# Patient Record
Sex: MALE | Race: BLACK OR AFRICAN AMERICAN | Employment: FULL TIME | URBAN - METROPOLITAN AREA
[De-identification: names, ages, dates, MRNs, and addresses within clinical notes are randomized per-mention and may not be internally consistent; named-entity substitution may affect disease eponyms.]

---

## 2018-07-02 ENCOUNTER — HOSPITAL ENCOUNTER (EMERGENCY)
Age: 28
Discharge: HOME | End: 2018-07-02
Attending: EMERGENCY MEDICINE

## 2018-07-02 VITALS
WEIGHT: 150 LBS | OXYGEN SATURATION: 98 % | HEIGHT: 64 IN | RESPIRATION RATE: 18 BRPM | BODY MASS INDEX: 25.61 KG/M2 | HEART RATE: 83 BPM | SYSTOLIC BLOOD PRESSURE: 121 MMHG | TEMPERATURE: 97.3 F | DIASTOLIC BLOOD PRESSURE: 77 MMHG

## 2018-07-02 DIAGNOSIS — L02.91 ABSCESS: Primary | ICD-10-CM

## 2018-07-02 PROCEDURE — 86403 PARTICLE AGGLUT ANTBDY SCRN: CPT

## 2018-07-02 PROCEDURE — 87070 CULTURE OTHR SPECIMN AEROBIC: CPT

## 2018-07-02 PROCEDURE — 99283 EMERGENCY DEPT VISIT LOW MDM: CPT

## 2018-07-02 PROCEDURE — 10060 I&D ABSCESS SIMPLE/SINGLE: CPT

## 2018-07-02 PROCEDURE — 87205 SMEAR GRAM STAIN: CPT

## 2018-07-02 RX ORDER — IBUPROFEN 800 MG/1
800 TABLET ORAL EVERY 8 HOURS PRN
Qty: 20 TABLET | Refills: 0 | Status: SHIPPED | OUTPATIENT
Start: 2018-07-02 | End: 2019-04-30 | Stop reason: ALTCHOICE

## 2018-07-02 RX ORDER — HYDROCODONE BITARTRATE AND ACETAMINOPHEN 5; 325 MG/1; MG/1
1 TABLET ORAL EVERY 8 HOURS PRN
Qty: 6 TABLET | Refills: 0 | Status: SHIPPED | OUTPATIENT
Start: 2018-07-02 | End: 2018-07-04

## 2018-07-02 RX ORDER — DOXYCYCLINE HYCLATE 100 MG
100 TABLET ORAL 2 TIMES DAILY
Qty: 14 TABLET | Refills: 0 | Status: SHIPPED | OUTPATIENT
Start: 2018-07-02 | End: 2018-07-09

## 2018-07-02 ASSESSMENT — ENCOUNTER SYMPTOMS
VOMITING: 0
EYE PAIN: 0
NAUSEA: 0
WHEEZING: 0
COUGH: 0
SHORTNESS OF BREATH: 0
BACK PAIN: 0
RHINORRHEA: 0
SORE THROAT: 0
EYE ITCHING: 0
EYE DISCHARGE: 0

## 2018-07-02 ASSESSMENT — PAIN DESCRIPTION - LOCATION: LOCATION: ARM

## 2018-07-02 ASSESSMENT — PAIN DESCRIPTION - PAIN TYPE: TYPE: ACUTE PAIN

## 2018-07-02 ASSESSMENT — PAIN DESCRIPTION - ORIENTATION: ORIENTATION: RIGHT

## 2018-07-02 ASSESSMENT — PAIN SCALES - GENERAL: PAINLEVEL_OUTOF10: 8

## 2018-07-02 NOTE — ED NOTES
Pt to ed with abscess to the right axillary x 1 week. Pt states he tried to drain it yesterday without any luck. Pt states it is draining clear fluid.  Pt rates pain [facility]  07/02/18 1720

## 2018-07-02 NOTE — ED PROVIDER NOTES
[facility]     Emergency Department     Faculty Attestation    I performed a history and physical examination of the patient and discussed management with the resident. I reviewed the residents note and agree with the documented findings including all diagnostic interpretations and plan of care. Any areas of disagreement are noted on the chart. I was personally present for the key portions of any procedures. I have documented in the chart those procedures where I was not present during the key portions. I have reviewed the emergency nurses triage note. I agree with the chief complaint, past medical history, past surgical history, allergies, medications, social and family history as documented unless otherwise noted below. Documentation of the HPI, Physical Exam and Medical Decision Making performed by scribcar is based on my personal performance of the HPI, PE and MDM. For Physician Assistant/ Nurse Practitioner cases/documentation I have personally evaluated this patient and have completed at least one if not all key elements of the E/M (history, physical exam, and MDM). Additional findings are as noted. Primary Care Physician: Ras Yanez MD    History: This is a 29 y.o. male who presents to the Emergency Department with complaint of abscess to right axilla. Ongoing for 1 week. No drainage. No fevers. Has previously had abscesses before that he drained himself. Physical:     height is 5' 4\" (1.626 m) and weight is 150 lb (68 kg). His oral temperature is 97.3 °F (36.3 °C). His blood pressure is 121/77 and his pulse is 83. His respiration is 18 and oxygen saturation is 98%.    29 y.o. male no acute distress, right axilla shows significant area of fluctuance and tenderness no surrounding cellulitis.     Impression: Abscess    Plan: Incision and drainage, Carissa Larios MD  Attending Emergency Physician        Lizandro Saucedo,

## 2018-07-04 LAB
CULTURE: ABNORMAL
DIRECT EXAM: ABNORMAL
Lab: ABNORMAL
SPECIMEN DESCRIPTION: ABNORMAL
STATUS: ABNORMAL

## 2019-04-06 ENCOUNTER — HOSPITAL ENCOUNTER (EMERGENCY)
Age: 29
Discharge: HOME | End: 2019-04-06
Attending: EMERGENCY MEDICINE
Payer: COMMERCIAL

## 2019-04-06 VITALS
RESPIRATION RATE: 16 BRPM | BODY MASS INDEX: 26.61 KG/M2 | HEART RATE: 102 BPM | TEMPERATURE: 97.7 F | DIASTOLIC BLOOD PRESSURE: 80 MMHG | OXYGEN SATURATION: 99 % | SYSTOLIC BLOOD PRESSURE: 130 MMHG | WEIGHT: 155 LBS

## 2019-04-06 DIAGNOSIS — B07.9 VIRAL WARTS, UNSPECIFIED TYPE: Primary | ICD-10-CM

## 2019-04-06 PROCEDURE — G0382 LEV 3 HOSP TYPE B ED VISIT: HCPCS

## 2019-04-06 ASSESSMENT — ENCOUNTER SYMPTOMS
BACK PAIN: 0
SORE THROAT: 0
EYE DISCHARGE: 0
VOMITING: 0
WHEEZING: 0
RHINORRHEA: 0
NAUSEA: 0
SHORTNESS OF BREATH: 0
EYE ITCHING: 0
COUGH: 0
EYE PAIN: 0
ROS SKIN COMMENTS: +LESION

## 2019-04-06 NOTE — ED NOTES
Walk-in clinic stated they're unable to assist patient, will have to go to dermatologist. Roberth Nevarez provided patient list of dermatologists for insurance.       Hunter Layne, MSW, LSW  04/06/19 9358

## 2019-04-06 NOTE — ED PROVIDER NOTES
No    Sexual activity: Yes     Partners: Female   Lifestyle    Physical activity:     Days per week: Not on file     Minutes per session: Not on file    Stress: Not on file   Relationships    Social connections:     Talks on phone: Not on file     Gets together: Not on file     Attends Baptism service: Not on file     Active member of club or organization: Not on file     Attends meetings of clubs or organizations: Not on file     Relationship status: Not on file    Intimate partner violence:     Fear of current or ex partner: Not on file     Emotionally abused: Not on file     Physically abused: Not on file     Forced sexual activity: Not on file   Other Topics Concern    Not on file   Social History Narrative    Not on file       History reviewed. No pertinent family history. Allergies:  Patient has no known allergies. Home Medications:  Prior to Admission medications    Medication Sig Start Date End Date Taking? Authorizing Provider   ibuprofen (ADVIL;MOTRIN) 800 MG tablet Take 1 tablet by mouth every 8 hours as needed for Pain 7/2/18   Agusto Peralta PA-C       patient's medication list has been reviewed as entered by the nursing staff. REVIEW OF SYSTEMS    (2-9 systems for level 4, 10 or more for level 5)      Review of Systems   Constitutional: Negative for chills and fever. HENT: Negative for ear pain, rhinorrhea and sore throat. Eyes: Negative for pain, discharge and itching. Respiratory: Negative for cough, shortness of breath and wheezing. Cardiovascular: Negative for chest pain and palpitations. Gastrointestinal: Negative for nausea and vomiting. Musculoskeletal: Negative for back pain and myalgias. Skin: Negative for rash and wound. +lesion       PHYSICAL EXAM  (up to 7 for level 4, 8 or more for level 5)      INITIAL VITALS:  weight is 155 lb (70.3 kg). His oral temperature is 97.7 °F (36.5 °C). His blood pressure is 130/80 and his pulse is 102.  His respiration

## 2019-04-08 ENCOUNTER — TELEPHONE (OUTPATIENT)
Dept: DERMATOLOGY | Age: 29
End: 2019-04-08

## 2019-04-30 ENCOUNTER — OFFICE VISIT (OUTPATIENT)
Dept: DERMATOLOGY | Age: 29
End: 2019-04-30
Payer: COMMERCIAL

## 2019-04-30 VITALS
WEIGHT: 150.2 LBS | SYSTOLIC BLOOD PRESSURE: 131 MMHG | HEART RATE: 96 BPM | DIASTOLIC BLOOD PRESSURE: 84 MMHG | BODY MASS INDEX: 25.78 KG/M2 | OXYGEN SATURATION: 98 %

## 2019-04-30 DIAGNOSIS — D48.5 NEOPLASM OF UNCERTAIN BEHAVIOR OF SKIN: Primary | ICD-10-CM

## 2019-04-30 PROCEDURE — 99999 PR OFFICE/OUTPT VISIT,PROCEDURE ONLY: CPT | Performed by: DERMATOLOGY

## 2019-04-30 PROCEDURE — 11102 TANGNTL BX SKIN SINGLE LES: CPT | Performed by: DERMATOLOGY

## 2019-04-30 PROCEDURE — 11103 TANGNTL BX SKIN EA SEP/ADDL: CPT | Performed by: DERMATOLOGY

## 2019-04-30 RX ORDER — LIDOCAINE HYDROCHLORIDE AND EPINEPHRINE 10; 10 MG/ML; UG/ML
0.5 INJECTION, SOLUTION INFILTRATION; PERINEURAL ONCE
Status: COMPLETED | OUTPATIENT
Start: 2019-04-30 | End: 2019-04-30

## 2019-04-30 RX ADMIN — LIDOCAINE HYDROCHLORIDE AND EPINEPHRINE 0.5 ML: 10; 10 INJECTION, SOLUTION INFILTRATION; PERINEURAL at 11:43

## 2019-04-30 NOTE — PROGRESS NOTES
Dermatology Patient Note  [facility] DERMATOLOGY  [address]  [address] [place] [address]  Dept: [phone]  Dept Fax: [phone]      VISITDATE: 4/30/2019   REFERRING PROVIDER: No ref. provider found      Gonzales Marcial is a 34 y.o. male  who presents today in the office for:    New Patient (Pt states that he has gential warts that he would like removed. )      HISTORY OF PRESENT ILLNESS:  34 y.o. male presenting for warts  Location: genitals  Duration: 5 years  Symptoms: none  Course: stable  Exacerbating factors: unsure  Prior treatments: none  Decided he wanted to have them treated      CURRENT MEDICATIONS:   No current outpatient medications on file. Current Facility-Administered Medications   Medication Dose Route Frequency Provider Last Rate Last Dose    lidocaine-EPINEPHrine 1 percent-1:336140 injection 0.5 mL  0.5 mL Intradermal Once Patricia Rg MD           ALLERGIES:   No Known Allergies    SOCIAL HISTORY:  Social History     Tobacco Use    Smoking status: Current Every Day Smoker     Packs/day: 0.50     Types: Cigarettes    Smokeless tobacco: Never Used   Substance Use Topics    Alcohol use: Yes       REVIEW OF SYSTEMS:  Review of Systems  Skin: Denies any new changing, growing orbleeding lesions or rashes except as described in the HPI   Constitutional: Denies fevers, chills, and malaise. PHYSICAL EXAM:   /84 (Site: Right Upper Arm, Position: Sitting, Cuff Size: Medium Adult)   Pulse 96   Wt 150 lb 3.2 oz (68.1 kg)   SpO2 98%   BMI 25.78 kg/m²     General Exam:  General Appearance: No acute distress, Well nourished     Neuro: Alert and oriented to person, place and time  Psych: Normal affect   Lymph Node: Not performed    Cutaneous Exam: Performed as documented in clinic note below. Sun-exposed skin, which includes the head/face, neck, both arms, digits and/or nails was examined.  Also examined genitals    Pertinent Physical Exam Findings:  Physical Fever    Biopsy Results    Biopsy results are usually available in 1-2 weeks. We provide biopsy results in letters for begin results or we will call for any concerning results. If you have not heard from our staff please call the office within 2 weeks. Please call our office with any concerns at [phone]. Follow up in 6 weeks. Follow-up: No follow-ups on file. This note was created with the assistance of a speech-recognition program.  Although the intention is to generate a document that actually reflects the content of the visit, no guarantees can be provided that every mistake has been identified and corrected byediting.     Electronically signed by Fransisca Vanegas MD on 4/30/19 at 9:53 AM

## 2019-05-02 ENCOUNTER — TELEPHONE (OUTPATIENT)
Dept: DERMATOLOGY | Age: 29
End: 2019-05-02

## 2019-05-02 NOTE — TELEPHONE ENCOUNTER
Please inform patient that the lesions were genital warts as expected.  Keep appt in June for cryotherapy

## 2019-05-02 NOTE — TELEPHONE ENCOUNTER
Pt advised.     Future Appointments   Date Time Provider Roldan Belia   6/12/2019  3:45 PM Daniel Burden MD  derm [facility]

## 2019-11-02 ENCOUNTER — HOSPITAL ENCOUNTER (EMERGENCY)
Age: 29
Discharge: HOME | End: 2019-11-02
Attending: EMERGENCY MEDICINE
Payer: COMMERCIAL

## 2019-11-02 VITALS
WEIGHT: 155 LBS | SYSTOLIC BLOOD PRESSURE: 118 MMHG | OXYGEN SATURATION: 97 % | TEMPERATURE: 98.5 F | DIASTOLIC BLOOD PRESSURE: 82 MMHG | HEART RATE: 98 BPM | BODY MASS INDEX: 26.46 KG/M2 | RESPIRATION RATE: 14 BRPM | HEIGHT: 64 IN

## 2019-11-02 DIAGNOSIS — H10.31 ACUTE CONJUNCTIVITIS OF RIGHT EYE, UNSPECIFIED ACUTE CONJUNCTIVITIS TYPE: Primary | ICD-10-CM

## 2019-11-02 LAB
ANION GAP SERPL CALCULATED.3IONS-SCNC: 13 MMOL/L (ref 9–17)
BUN BLDV-MCNC: 9 MG/DL (ref 6–20)
BUN/CREAT BLD: ABNORMAL (ref 9–20)
CALCIUM SERPL-MCNC: 9.3 MG/DL (ref 8.6–10.4)
CHLORIDE BLD-SCNC: 97 MMOL/L (ref 98–107)
CO2: 25 MMOL/L (ref 20–31)
CREAT SERPL-MCNC: 1.15 MG/DL (ref 0.7–1.2)
DIRECT EXAM: NORMAL
GFR AFRICAN AMERICAN: >60 ML/MIN
GFR NON-AFRICAN AMERICAN: >60 ML/MIN
GFR SERPL CREATININE-BSD FRML MDRD: ABNORMAL ML/MIN/{1.73_M2}
GFR SERPL CREATININE-BSD FRML MDRD: ABNORMAL ML/MIN/{1.73_M2}
GLUCOSE BLD-MCNC: 109 MG/DL (ref 70–99)
HCT VFR BLD CALC: 46.8 % (ref 40.7–50.3)
HEMOGLOBIN: 15.3 G/DL (ref 13–17)
Lab: NORMAL
MCH RBC QN AUTO: 28.1 PG (ref 25.2–33.5)
MCHC RBC AUTO-ENTMCNC: 32.7 G/DL (ref 28.4–34.8)
MCV RBC AUTO: 85.9 FL (ref 82.6–102.9)
NRBC AUTOMATED: 0 PER 100 WBC
PDW BLD-RTO: 12.2 % (ref 11.8–14.4)
PLATELET # BLD: 311 K/UL (ref 138–453)
PMV BLD AUTO: 8.7 FL (ref 8.1–13.5)
POTASSIUM SERPL-SCNC: 4.3 MMOL/L (ref 3.7–5.3)
RBC # BLD: 5.45 M/UL (ref 4.21–5.77)
SODIUM BLD-SCNC: 135 MMOL/L (ref 135–144)
SPECIMEN DESCRIPTION: NORMAL
WBC # BLD: 13.7 K/UL (ref 3.5–11.3)

## 2019-11-02 PROCEDURE — 6370000000 HC RX 637 (ALT 250 FOR IP): Performed by: STUDENT IN AN ORGANIZED HEALTH CARE EDUCATION/TRAINING PROGRAM

## 2019-11-02 PROCEDURE — 87880 STREP A ASSAY W/OPTIC: CPT

## 2019-11-02 PROCEDURE — 80048 BASIC METABOLIC PNL TOTAL CA: CPT

## 2019-11-02 PROCEDURE — 2580000003 HC RX 258: Performed by: STUDENT IN AN ORGANIZED HEALTH CARE EDUCATION/TRAINING PROGRAM

## 2019-11-02 PROCEDURE — 85027 COMPLETE CBC AUTOMATED: CPT

## 2019-11-02 PROCEDURE — 99283 EMERGENCY DEPT VISIT LOW MDM: CPT

## 2019-11-02 PROCEDURE — 2500000003 HC RX 250 WO HCPCS: Performed by: STUDENT IN AN ORGANIZED HEALTH CARE EDUCATION/TRAINING PROGRAM

## 2019-11-02 RX ORDER — POLYMYXIN B SULFATE AND TRIMETHOPRIM 1; 10000 MG/ML; [USP'U]/ML
1 SOLUTION OPHTHALMIC EVERY 4 HOURS
Qty: 1 BOTTLE | Refills: 0 | Status: SHIPPED | OUTPATIENT
Start: 2019-11-02 | End: 2019-11-09

## 2019-11-02 RX ORDER — IBUPROFEN 400 MG/1
400 TABLET ORAL EVERY 8 HOURS PRN
Qty: 30 TABLET | Refills: 0 | Status: SHIPPED | OUTPATIENT
Start: 2019-11-02

## 2019-11-02 RX ORDER — 0.9 % SODIUM CHLORIDE 0.9 %
1000 INTRAVENOUS SOLUTION INTRAVENOUS ONCE
Status: COMPLETED | OUTPATIENT
Start: 2019-11-02 | End: 2019-11-02

## 2019-11-02 RX ORDER — IBUPROFEN 400 MG/1
400 TABLET ORAL ONCE
Status: COMPLETED | OUTPATIENT
Start: 2019-11-02 | End: 2019-11-02

## 2019-11-02 RX ORDER — PROPARACAINE HYDROCHLORIDE 5 MG/ML
1 SOLUTION/ DROPS OPHTHALMIC ONCE
Status: COMPLETED | OUTPATIENT
Start: 2019-11-02 | End: 2019-11-02

## 2019-11-02 RX ADMIN — PROPARACAINE HYDROCHLORIDE 1 DROP: 5 SOLUTION/ DROPS OPHTHALMIC at 12:06

## 2019-11-02 RX ADMIN — IBUPROFEN 400 MG: 400 TABLET, FILM COATED ORAL at 12:06

## 2019-11-02 RX ADMIN — SODIUM CHLORIDE 1000 ML: 9 INJECTION, SOLUTION INTRAVENOUS at 12:17

## 2019-11-02 RX ADMIN — FLUORESCEIN SODIUM 1 MG: 1 STRIP OPHTHALMIC at 12:07

## 2019-11-02 ASSESSMENT — ENCOUNTER SYMPTOMS
SORE THROAT: 0
SHORTNESS OF BREATH: 0
EYE DISCHARGE: 1
EYE REDNESS: 1
PHOTOPHOBIA: 0
DIARRHEA: 0
VOMITING: 0
CONSTIPATION: 0
EYE PAIN: 0
COUGH: 0
RHINORRHEA: 0
NAUSEA: 0

## 2019-11-02 ASSESSMENT — PAIN DESCRIPTION - ORIENTATION: ORIENTATION: RIGHT

## 2019-11-02 ASSESSMENT — PAIN SCALES - GENERAL
PAINLEVEL_OUTOF10: 6
PAINLEVEL_OUTOF10: 6

## 2019-11-02 ASSESSMENT — PAIN DESCRIPTION - DESCRIPTORS: DESCRIPTORS: DISCOMFORT

## 2019-11-02 ASSESSMENT — PAIN DESCRIPTION - FREQUENCY: FREQUENCY: CONTINUOUS

## 2019-11-02 ASSESSMENT — PAIN DESCRIPTION - LOCATION: LOCATION: EYE

## 2019-11-02 ASSESSMENT — PAIN DESCRIPTION - PAIN TYPE: TYPE: ACUTE PAIN

## 2019-11-06 ENCOUNTER — HOSPITAL ENCOUNTER (EMERGENCY)
Age: 29
Discharge: HOME | End: 2019-11-06
Attending: EMERGENCY MEDICINE
Payer: COMMERCIAL

## 2019-11-06 VITALS
SYSTOLIC BLOOD PRESSURE: 133 MMHG | HEART RATE: 99 BPM | RESPIRATION RATE: 16 BRPM | HEIGHT: 64 IN | TEMPERATURE: 97.9 F | DIASTOLIC BLOOD PRESSURE: 84 MMHG | WEIGHT: 144 LBS | OXYGEN SATURATION: 100 % | BODY MASS INDEX: 24.59 KG/M2

## 2019-11-06 DIAGNOSIS — J06.9 VIRAL URI: Primary | ICD-10-CM

## 2019-11-06 PROCEDURE — 99282 EMERGENCY DEPT VISIT SF MDM: CPT

## 2019-11-06 RX ORDER — FLUTICASONE PROPIONATE 50 MCG
1 SPRAY, SUSPENSION (ML) NASAL DAILY
Qty: 1 BOTTLE | Refills: 0 | Status: SHIPPED | OUTPATIENT
Start: 2019-11-06 | End: 2019-11-13

## 2019-11-06 ASSESSMENT — ENCOUNTER SYMPTOMS
SORE THROAT: 1
RHINORRHEA: 1
COUGH: 0
SINUS PRESSURE: 1

## 2019-11-06 ASSESSMENT — PAIN SCALES - GENERAL: PAINLEVEL_OUTOF10: 2

## 2021-04-08 ENCOUNTER — HOSPITAL ENCOUNTER (EMERGENCY)
Age: 31
Discharge: LEFT BEFORE DISCHARGE | End: 2021-04-08
Attending: EMERGENCY MEDICINE

## 2021-04-08 VITALS
TEMPERATURE: 100.2 F | DIASTOLIC BLOOD PRESSURE: 84 MMHG | RESPIRATION RATE: 16 BRPM | BODY MASS INDEX: 24.75 KG/M2 | WEIGHT: 145 LBS | HEIGHT: 64 IN | OXYGEN SATURATION: 98 % | SYSTOLIC BLOOD PRESSURE: 135 MMHG | HEART RATE: 124 BPM

## 2021-04-08 DIAGNOSIS — B34.9 VIRAL ILLNESS: Primary | ICD-10-CM

## 2021-04-08 NOTE — PROGRESS NOTES
Patient eloped prior to my evaluation. I did not see or participate in the care of this patient.     Patricia Archer MD

## 2022-06-03 ENCOUNTER — HOSPITAL ENCOUNTER (EMERGENCY)
Age: 32
Discharge: HOME | End: 2022-06-03
Attending: EMERGENCY MEDICINE
Payer: COMMERCIAL

## 2022-06-03 VITALS
BODY MASS INDEX: 21.85 KG/M2 | OXYGEN SATURATION: 99 % | HEART RATE: 58 BPM | SYSTOLIC BLOOD PRESSURE: 131 MMHG | DIASTOLIC BLOOD PRESSURE: 80 MMHG | TEMPERATURE: 96.8 F | WEIGHT: 128 LBS | HEIGHT: 64 IN | RESPIRATION RATE: 18 BRPM

## 2022-06-03 DIAGNOSIS — R30.0 DYSURIA: Primary | ICD-10-CM

## 2022-06-03 LAB
-: ABNORMAL
BILIRUBIN URINE: NEGATIVE
COLOR: YELLOW
EPITHELIAL CELLS UA: ABNORMAL /HPF (ref 0–5)
GLUCOSE URINE: NEGATIVE
KETONES, URINE: ABNORMAL
LEUKOCYTE ESTERASE, URINE: ABNORMAL
NITRITE, URINE: NEGATIVE
PH UA: 6.5 (ref 5–8)
PROTEIN UA: ABNORMAL
RBC UA: ABNORMAL /HPF (ref 0–4)
SOURCE: NORMAL
SPECIFIC GRAVITY UA: 1.03 (ref 1–1.03)
TRICHOMONAS VAGINALI, MOLECULAR: NEGATIVE
TURBIDITY: ABNORMAL
URINE HGB: ABNORMAL
UROBILINOGEN, URINE: NORMAL
WBC UA: ABNORMAL /HPF (ref 0–5)

## 2022-06-03 PROCEDURE — 87661 TRICHOMONAS VAGINALIS AMPLIF: CPT

## 2022-06-03 PROCEDURE — 87491 CHLMYD TRACH DNA AMP PROBE: CPT

## 2022-06-03 PROCEDURE — 87591 N.GONORRHOEAE DNA AMP PROB: CPT

## 2022-06-03 PROCEDURE — 96372 THER/PROPH/DIAG INJ SC/IM: CPT

## 2022-06-03 PROCEDURE — 99284 EMERGENCY DEPT VISIT MOD MDM: CPT

## 2022-06-03 PROCEDURE — 6370000000 HC RX 637 (ALT 250 FOR IP): Performed by: STUDENT IN AN ORGANIZED HEALTH CARE EDUCATION/TRAINING PROGRAM

## 2022-06-03 PROCEDURE — 81001 URINALYSIS AUTO W/SCOPE: CPT

## 2022-06-03 PROCEDURE — 6360000002 HC RX W HCPCS: Performed by: STUDENT IN AN ORGANIZED HEALTH CARE EDUCATION/TRAINING PROGRAM

## 2022-06-03 RX ORDER — DOXYCYCLINE HYCLATE 100 MG
100 TABLET ORAL ONCE
Status: COMPLETED | OUTPATIENT
Start: 2022-06-03 | End: 2022-06-03

## 2022-06-03 RX ORDER — CEFTRIAXONE 500 MG/1
500 INJECTION, POWDER, FOR SOLUTION INTRAMUSCULAR; INTRAVENOUS ONCE
Status: COMPLETED | OUTPATIENT
Start: 2022-06-03 | End: 2022-06-03

## 2022-06-03 RX ORDER — DOXYCYCLINE HYCLATE 100 MG
100 TABLET ORAL 2 TIMES DAILY
Qty: 14 TABLET | Refills: 0 | Status: SHIPPED | OUTPATIENT
Start: 2022-06-03 | End: 2022-06-10

## 2022-06-03 RX ADMIN — CEFTRIAXONE SODIUM 500 MG: 500 INJECTION, POWDER, FOR SOLUTION INTRAMUSCULAR; INTRAVENOUS at 06:56

## 2022-06-03 RX ADMIN — DOXYCYCLINE HYCLATE 100 MG: 100 TABLET, COATED ORAL at 06:56

## 2022-06-03 ASSESSMENT — ENCOUNTER SYMPTOMS
SHORTNESS OF BREATH: 0
NAUSEA: 0
ABDOMINAL PAIN: 0
VOMITING: 0

## 2022-06-03 NOTE — ED PROVIDER NOTES
[facility] ED  Emergency Department  Faculty Sign-Out Addendum     Care of Mason Phoenix was assumed from previous attending and is being seen for No chief complaint on file. .  The patient's initial evaluation and plan have been discussed with the prior provider who initially evaluated the patient. Handoff taken on the following patient from prior Attending Physician:    Mercedes Boyd    I was available and discussed any additional care issues that arose and coordinated the management plans with the resident(s) caring for the patient during my duty period. Any areas of disagreement with residents documentation of care or procedures are noted on the chart. I was personally present for the key portions of any/all procedures during my duty period. I have documented in the chart those procedures where I was not present during the key portions. EMERGENCY DEPARTMENT COURSE / MEDICAL DECISION MAKING:       MEDICATIONS GIVEN:  Orders Placed This Encounter   Medications    cefTRIAXone (ROCEPHIN) injection 500 mg     Order Specific Question:   Antimicrobial Indications     Answer:   STD infection    doxycycline hyclate (VIBRA-TABS) tablet 100 mg     Order Specific Question:   Antimicrobial Indications     Answer:   STD infection       LABS / RADIOLOGY:     Labs Reviewed   C.TRACHOMATIS N.GONORRHOEAE DNA, URINE   TRICHOMONAS VAGINALI, MOLECULAR   URINALYSIS WITH MICROSCOPIC       No results found. RECENT VITALS:     Temp: 96.8 °F (36 °C),  Heart Rate: 58, Resp: 18, BP: 131/80, SpO2: 99 %    This patient is a 28 y.o. Male with dysuria. Concern for STI. Awaiting urine results. Empiric treatment. OUTSTANDING TASKS / RECOMMENDATIONS:    1.  Urine results      Dianna Saldana MD, Janes Sellers  Attending Emergency Physician  [facility] ED        Adele Arevalo MD  06/03/22 5404

## 2022-06-03 NOTE — ED PROVIDER NOTES
[facility]  ED  Emergency Department Encounter  EmergencyMedicine Resident     Pt Name:Fer Rebolledo  MRN: [id number]  [place] 1990  Date of evaluation: 6/3/22  PCP:  No primary care provider on file. CHIEF COMPLAINT       No chief complaint on file. HISTORY OF PRESENT ILLNESS  (Location/Symptom, Timing/Onset, Context/Setting, Quality, Duration, Modifying Factors, Severity.)      Hamzah Xiong is a 28 y.o. male who presents with dysuria, penile discharge, concern for STD. Patient states symptoms have been ongoing for past 2 days. Denies any associated fever, chills, nausea, vomiting, abdominal pain, rash. No associated testicular swelling, testicular pain. PAST MEDICAL / SURGICAL / SOCIAL / FAMILY HISTORY      has a past medical history of History of gonorrhea. Denies any past surgical history    Social History     Socioeconomic History    Marital status:      Spouse name: Not on file    Number of children: Not on file    Years of education: Not on file    Highest education level: Not on file   Occupational History    Not on file   Tobacco Use    Smoking status: Current Every Day Smoker     Packs/day: 0.25     Types: Cigarettes    Smokeless tobacco: Never Used   Substance and Sexual Activity    Alcohol use: Yes    Drug use: No    Sexual activity: Not Currently     Partners: Female   Other Topics Concern    Not on file   Social History Narrative    Not on file     Social Determinants of Health     Financial Resource Strain:     Difficulty of Paying Living Expenses: Not on file   Food Insecurity:     Worried About Running Out of Food in the Last Year: Not on file    [place] of Food in the Last Year: Not on file   Transportation Needs:     Lack of Transportation (Medical): Not on file    Lack of Transportation (Non-Medical):  Not on file   Physical Activity:     Days of Exercise per Week: Not on file    Minutes of Exercise per Session: Not on file Stress:     Feeling of Stress : Not on file   Social Connections:     Frequency of Communication with Friends and Family: Not on file    Frequency of Social Gatherings with Friends and Family: Not on file    Attends Taoist Services: Not on file    Active Member of Clubs or Organizations: Not on file    Attends Club or Organization Meetings: Not on file    Marital Status: Not on file   Intimate Partner Violence:     Fear of Current or Ex-Partner: Not on file    Emotionally Abused: Not on file    Physically Abused: Not on file    Sexually Abused: Not on file   Housing Stability:     Unable to Pay for Housing in the Last Year: Not on file    Number of [place] in the Last Year: Not on file    Unstable Housing in the Last Year: Not on file       No family history on file. Allergies:  Patient has no known allergies. Home Medications:  Prior to Admission medications    Medication Sig Start Date End Date Taking? Authorizing Provider   doxycycline hyclate (VIBRA-TABS) 100 MG tablet Take 1 tablet by mouth 2 times daily for 7 days 6/3/22 6/10/22 Yes Maribel Roles, DO   fluticasone [facility]) 50 MCG/ACT nasal spray 1 spray by Each Nostril route daily for 7 days 11/6/19 11/13/19  Dorlene Flatness, APRN - CNP   ibuprofen (IBU) 400 MG tablet Take 1 tablet by mouth every 8 hours as needed for Pain 11/2/19   [facility], DO       REVIEW OF SYSTEMS    (2-9 systems for level 4, 10 or more for level 5)      Review of Systems   Constitutional: Negative for fatigue and fever. Respiratory: Negative for shortness of breath. Cardiovascular: Negative for chest pain. Gastrointestinal: Negative for abdominal pain, nausea and vomiting. Genitourinary: Positive for dysuria and penile discharge. Negative for flank pain, penile pain and penile swelling. Skin: Negative for rash. Neurological: Negative for syncope and headaches.        PHYSICAL EXAM   (up to 7 for level 4, 8 or more for level 5)      INITIAL VITALS:   /80   Pulse 58   Temp 96.8 °F (36 °C)   Resp 18   Ht 5' 4\" (1.626 m)   Wt 128 lb (58.1 kg)   SpO2 99%   BMI 21.97 kg/m²     Physical Exam  Constitutional:       General: He is not in acute distress. Appearance: He is not toxic-appearing. HENT:      Head: Normocephalic and atraumatic. Cardiovascular:      Rate and Rhythm: Normal rate. Pulmonary:      Effort: Pulmonary effort is normal.   Abdominal:      General: There is no distension. Tenderness: There is no abdominal tenderness. Neurological:      Mental Status: He is alert.       Gait: Gait normal.         DIFFERENTIAL  DIAGNOSIS     PLAN (LABS / IMAGING / EKG):  Orders Placed This Encounter   Procedures    C.trachomatis N.gonorrhoeae DNA, Urine    Trichomonas Vaginali, Molecular    Urinalysis with Microscopic       MEDICATIONS ORDERED:  Orders Placed This Encounter   Medications    cefTRIAXone (ROCEPHIN) injection 500 mg     Order Specific Question:   Antimicrobial Indications     Answer:   STD infection    doxycycline hyclate (VIBRA-TABS) tablet 100 mg     Order Specific Question:   Antimicrobial Indications     Answer:   STD infection    doxycycline hyclate (VIBRA-TABS) 100 MG tablet     Sig: Take 1 tablet by mouth 2 times daily for 7 days     Dispense:  14 tablet     Refill:  0       DDX: Gonorrhea, chlamydia, trichomonas, UTI    DIAGNOSTIC RESULTS / EMERGENCY DEPARTMENT COURSE / MDM   LAB RESULTS:  Results for orders placed or performed during the hospital encounter of 06/03/22   Urinalysis with Microscopic   Result Value Ref Range    Color, UA Yellow Yellow    Turbidity UA Cloudy (A) Clear    Glucose, Ur NEGATIVE NEGATIVE    Bilirubin Urine NEGATIVE NEGATIVE    Ketones, Urine TRACE (A) NEGATIVE    Specific Gravity, UA 1.029 1.005 - 1.030    Urine Hgb SMALL (A) NEGATIVE    pH, UA 6.5 5.0 - 8.0    Protein, UA 2+ (A) NEGATIVE    Urobilinogen, Urine Normal Normal    Nitrite, Urine NEGATIVE NEGATIVE    Leukocyte Esterase, Urine LARGE (A) NEGATIVE    -          WBC, UA TOO NUMEROUS TO COUNT 0 - 5 /HPF    RBC, UA 10 TO 20 0 - 4 /HPF    Epithelial Cells UA None 0 - 5 /HPF       IMPRESSION/ ED Course: 79-year-old male in no acute distress sitting with dysuria, penile discharge ongoing for past 2 days. Vital signs within normal limits, exam benign. Urinalysis remarkable for numerous WBCs, large leukocyte esterase. Patient treated presumptively for gonorrhea and chlamydia with IM Rocephin and p.o. doxycycline, given outpatient prescription for p.o. doxycycline and instructions to follow-up on his results as well as ED return precautions. He verbalized understanding of and agreement with discharge plan. CONSULTS:  None    CRITICAL CARE:  Please see attending note    FINAL IMPRESSION      1.  Dysuria          DISPOSITION / PLAN     DISPOSITION Decision To Discharge 06/03/2022 08:34:17 AM      PATIENT REFERRED TO:  [address] [place], [place] [id number] [phone]  Schedule an appointment as soon as possible for a visit   For re-evaluation      DISCHARGE MEDICATIONS:  New Prescriptions    DOXYCYCLINE HYCLATE (VIBRA-TABS) 100 MG TABLET    Take 1 tablet by mouth 2 times daily for 7 days       Edwina Baron DO  Emergency Medicine Resident    (Please note that portions of thisnote were completed with a voice recognition program.  Efforts were made to edit the dictations but occasionally words are mis-transcribed.)        Edwina Baron DO  Resident  06/03/22 9239

## 2022-06-04 NOTE — ED PROVIDER NOTES
[facility]   Emergency Department  Faculty Attestation       I performed a history and physical examination of the patient and discussed management with the resident. I reviewed the residents note and agree with the documented findings including all diagnostic interpretations and plan of care. Any areas of disagreement are noted on the chart. I was personally present for the key portions of any procedures. I have documented in the chart those procedures where I was not present during the key portions. I have reviewed the emergency nurses triage note. I agree with the chief complaint, past medical history, past surgical history, allergies, medications, social and family history as documented unless otherwise noted below. Documentation of the HPI, Physical Exam and Medical Decision Making performed by americoibcar is based on my personal performance of the HPI, PE and MDM. For Physician Assistant/ Nurse Practitioner cases/documentation I have personally evaluated this patient and have completed at least one if not all key elements of the E/M (history, physical exam, and MDM). Additional findings are as noted. Pertinent Comments     Primary Care Physician: No primary care provider on file. ED Triage Vitals   BP Temp Temp src Heart Rate Resp SpO2 Height Weight   06/03/22 0647 06/03/22 0647 -- 06/03/22 0647 06/03/22 0647 06/03/22 0647 06/03/22 0648 06/03/22 0648   131/80 96.8 °F (36 °C)  58 18 99 % 5' 4\" (1.626 m) 128 lb (58.1 kg)        History/Physical:   This is a 28 y.o. male who presents to the Emergency Department for concern of STI. Patient reports dysuria stating approximately 2 ago also having dysuria. denies any known STI exposure. He denies any systemic symptoms. On exam patient is awake and alert in no acute distress. See resident documentation for  exam.     MDM/Plan:   STI check  Send urine for Gonorrhea, Chlamydia, and Trichomoniasis   will empirically treat at this time.    Lenin Carrasco for d/c with instructions to follow-up results on MyChart. Standard post STI testing instructions and safe sex practices given.       Critical Care: None     Edith Martel MD  Attending Emergency Physician ]      Edith Martel MD  06/03/22 8541

## 2022-06-07 ENCOUNTER — TELEPHONE (OUTPATIENT)
Dept: PHARMACY | Age: 32
End: 2022-06-07

## 2022-06-07 LAB
C. TRACHOMATIS DNA ,URINE: NEGATIVE
N. GONORRHOEAE DNA, URINE: ABNORMAL
SPECIMEN DESCRIPTION: ABNORMAL

## 2022-06-07 NOTE — TELEPHONE ENCOUNTER
CLINICAL PHARMACY NOTE:  Documentation of review for Positive STD Test    At the time of Paula Jeffries visit to [facility] on 6/3/2022 STD testing was performed. DNA testing was positive for Gonorrhea. Pregnancy status:  male. While in the ED, patient was given ceftriaxone 500 mg  IM x 1 dose and a prescription for doxycycline 100mg orally twice daily x 7 days. Treatment appropriate, no follow up needed at this time.      Henry Marquez, Pharmacy Student    For Pharmacy Admin Tracking Only     Intervention Detail:    Total # of Interventions Recommended: 0   Total # of Interventions Accepted: 0   Time Spent (min): 5

## 2022-11-28 ENCOUNTER — HOSPITAL ENCOUNTER (EMERGENCY)
Age: 32
Discharge: HOME | End: 2022-11-28
Attending: EMERGENCY MEDICINE
Payer: COMMERCIAL

## 2022-11-28 VITALS
TEMPERATURE: 97.7 F | SYSTOLIC BLOOD PRESSURE: 107 MMHG | DIASTOLIC BLOOD PRESSURE: 80 MMHG | RESPIRATION RATE: 18 BRPM | HEART RATE: 68 BPM | OXYGEN SATURATION: 96 %

## 2022-11-28 DIAGNOSIS — G89.29 CHRONIC LEFT SHOULDER PAIN: Primary | ICD-10-CM

## 2022-11-28 DIAGNOSIS — M25.512 CHRONIC LEFT SHOULDER PAIN: Primary | ICD-10-CM

## 2022-11-28 PROCEDURE — 99283 EMERGENCY DEPT VISIT LOW MDM: CPT

## 2022-11-28 RX ORDER — LIDOCAINE 50 MG/G
1 PATCH TOPICAL DAILY
Qty: 7 PATCH | Refills: 0 | Status: SHIPPED | OUTPATIENT
Start: 2022-11-28 | End: 2022-12-05

## 2022-11-28 RX ORDER — IBUPROFEN 600 MG/1
600 TABLET ORAL 3 TIMES DAILY PRN
Qty: 21 TABLET | Refills: 0 | Status: SHIPPED | OUTPATIENT
Start: 2022-11-28 | End: 2022-12-05

## 2022-11-28 ASSESSMENT — PAIN - FUNCTIONAL ASSESSMENT: PAIN_FUNCTIONAL_ASSESSMENT: 0-10

## 2022-11-28 ASSESSMENT — PAIN SCALES - GENERAL: PAINLEVEL_OUTOF10: 5

## 2022-11-28 NOTE — ED PROVIDER NOTES
[facility] ED  Emergency Department Encounter  Emergency Medicine Resident     Pt Name:Fer Tse  MRN: [id number]  [place] 1990  Date of evaluation: 11/28/22  PCP:  No primary care provider on file. CHIEF COMPLAINT       Chief Complaint   Patient presents with    Shoulder Pain     Left for some months; denies any known injury       HISTORY OF PRESENT ILLNESS  (Location/Symptom, Timing/Onset, Context/Setting, Quality, Duration, Modifying Factors, Severity.)      Bright Pineda is a 28 y.o. male who presents with complaints of left shoulder pain that has been ongoing for months and denies any known injury to shoulder. Patient request work note. No numbness, weakness, tingling or neck pain. Patient notes he is right-hand dominant. Patient notes full range of motion but pain worse with abduction past 90 degrees. PAST MEDICAL / SURGICAL / SOCIAL / FAMILY HISTORY      has a past medical history of History of gonorrhea. Reviewed with patient     has no past surgical history on file.   Reviewed with patient    Social History     Socioeconomic History    Marital status:      Spouse name: Not on file    Number of children: Not on file    Years of education: Not on file    Highest education level: Not on file   Occupational History    Not on file   Tobacco Use    Smoking status: Every Day     Packs/day: 0.25     Types: Cigarettes    Smokeless tobacco: Never   Substance and Sexual Activity    Alcohol use: Yes    Drug use: No    Sexual activity: Not Currently     Partners: Female   Other Topics Concern    Not on file   Social History Narrative    Not on file     Social Determinants of Health     Financial Resource Strain: Not on file   Food Insecurity: Not on file   Transportation Needs: Not on file   Physical Activity: Not on file   Stress: Not on file   Social Connections: Not on file   Intimate Partner Violence: Not on file   Housing Stability: Not on file       No family history on file. Allergies:  Patient has no known allergies. Home Medications:  Prior to Admission medications    Medication Sig Start Date End Date Taking? Authorizing Provider   ibuprofen (ADVIL;MOTRIN) 600 MG tablet Take 1 tablet by mouth 3 times daily as needed for Pain 11/28/22 12/5/22 Yes Ismael Steinberg MD   lidocaine (LIDODERM) 5 % Place 1 patch onto the skin daily for 7 days 12 hours on, 12 hours off. 11/28/22 12/5/22 Yes Ismael Steinberg MD   fluticasone Loras Ernst) 50 MCG/ACT nasal spray 1 spray by Each Nostril route daily for 7 days 11/6/19 11/13/19  Cecilia Navarro, APRN - CNP       REVIEW OF SYSTEMS    (2-9 systems for level 4, 10 or more for level 5)      Review of Systems   Constitutional:  Negative for chills and fever. HENT:  Negative for congestion and rhinorrhea. Eyes:  Negative for photophobia and visual disturbance. Respiratory:  Negative for shortness of breath and wheezing. Cardiovascular:  Negative for chest pain and palpitations. Gastrointestinal:  Negative for abdominal pain, nausea and vomiting. Genitourinary:  Negative for dysuria and frequency. Musculoskeletal:  Negative for back pain and neck pain. Positive for shoulder pain  Skin:  Negative for rash and wound. Neurological:  Negative for dizziness and headaches. PHYSICAL EXAM   (up to 7 for level 4, 8 or more for level 5)      INITIAL VITALS:   /80   Pulse 68   Temp 97.7 °F (36.5 °C) (Oral)   Resp 18   SpO2 96%     Physical Exam  Vitals and nursing note reviewed. Constitutional:       General: He is not in acute distress. HENT:      Head: Atraumatic. Right Ear: External ear normal.      Left Ear: External ear normal.      Nose: Nose normal.      Mouth/Throat:      Mouth: Mucous membranes are moist.      Pharynx: Oropharynx is clear. Eyes:      Conjunctiva/sclera: Conjunctivae normal.   Cardiovascular:      Rate and Rhythm: Normal rate and regular rhythm. Pulses: Normal pulses.    Pulmonary: Effort: Pulmonary effort is normal. No respiratory distress. Breath sounds: Normal breath sounds. No wheezing. Abdominal:      Palpations: Abdomen is soft. Tenderness: There is no abdominal tenderness. Musculoskeletal:         General: Normal range of motion. Cervical back: Normal range of motion. Minimal tenderness to anterior left shoulder to palpation. Skin:     General: Skin is warm and dry. Capillary Refill: Capillary refill takes less than 2 seconds. Neurological:      General: No focal deficit present. Mental Status: He is alert and oriented to person, place, and time. DIFFERENTIAL  DIAGNOSIS     PLAN (LABS / IMAGING / EKG):  No orders of the defined types were placed in this encounter. MEDICATIONS ORDERED:  Orders Placed This Encounter   Medications    ibuprofen (ADVIL;MOTRIN) 600 MG tablet     Sig: Take 1 tablet by mouth 3 times daily as needed for Pain     Dispense:  21 tablet     Refill:  0    lidocaine (LIDODERM) 5 %     Sig: Place 1 patch onto the skin daily for 7 days 12 hours on, 12 hours off. Dispense:  7 patch     Refill:  0       DDX: Musculoskeletal strain, impingement    DIAGNOSTIC RESULTS / EMERGENCY DEPARTMENT COURSE / MDM   LAB RESULTS:  No results found for this visit on 11/28/22. IMPRESSION: Chronic left shoulder pain    RADIOLOGY:  No orders to display       EMERGENCY DEPARTMENT COURSE:  40-year-old male presented to ED with complaints of left shoulder pain has been ongoing for the past few months without any known trauma or falls. Patient is right-hand dominant. On exam, afebrile, nontachycardic, full range of motion of left shoulder, minimal tenderness to anterior aspect of left shoulder. Patient provided with clinic list for PCP follow-up as well as Ortho follow-up instructed to return for any numbness, weakness, tingling, injury to left shoulder. Given prescription for ibuprofen, Lidoderm patch, work note.          No notes of EC Admission Criteria type on file. PROCEDURES:  None    CONSULTS:  None    FINAL IMPRESSION      1.  Chronic left shoulder pain          DISPOSITION / PLAN     DISPOSITION Decision To Discharge 11/28/2022 11:30:27 AM      PATIENT REFERRED TO:  Ludy Jerez, [address]  MOB 1 [facility] 1 [address]  [phone]      as soon as possible    [address] [address]  [phone]  Call in 1 week      [facility] ED  [address] [facility] [address] [phone]    As needed    DISCHARGE MEDICATIONS:  Discharge Medication List as of 11/28/2022 11:35 AM        START taking these medications    Details   lidocaine (LIDODERM) 5 % Place 1 patch onto the skin daily for 7 days 12 hours on, 12 hours off., Disp-7 patch, R-0Print             Boston Multani MD  Emergency Medicine Resident    (Please note that portions of thisnote were completed with a voice recognition program.  Efforts were made to edit the dictations but occasionally words are mis-transcribed.)         Lazaro Roberson MD  Resident  11/28/22 2881

## 2022-11-28 NOTE — Clinical Note
Jeromy Hung was seen and treated in our emergency department on 11/28/2022. He may return to work on 11/30/2022. If you have any questions or concerns, please don't hesitate to call.       Brian Wolf MD

## 2022-11-28 NOTE — DISCHARGE INSTRUCTIONS
Thank you for visiting [address] [facility] Emergency Department. You need to call [phone] [facility] to make an appointment as directed for follow up. Should you have any questions regarding your care or further treatment, please call [facility] Emergency Department at [phone]. Please return to emergency department for any new or worsening symptoms including any numbness, weakness, tingling, injury to left shoulder. [facility] ED Clinic List    Healthcare Providers Services Day of [facility]  [address] [place] [address]  [phone] Pediatric Primary Care  Adult Primary Care  OB/GYN/Prenatal/Specialty Clinics Monday - Friday  8:00a - 4:30p   [address] [place] [address]  Adult Medicine, Pediatrics, OB/GYN Monday - Friday  8:30a - [address] [facility]  [phone]  Wednesday 8:30a - 11:00a   [address] Adult Internal Medicine   [facility]  [phone]  [facility]  [phone]    Pediatric Clinic  [phone]   Monday, Tuesday, Thursday, Friday  8:00a - 4:30p  Wednesday 1:00p - 4:30p  Monday, Tuesday, Thursday 8:00a - 5:00p;  Friday 8:00a - 12:30p  Wednesday 1p - 4p  Monday, Tuesday, Thursday, Friday  8:30a - 4:15p  Wednesday 12:30p - [address] [facility]  [phone] Pediatric Primary Care  Adult Primary Care  OB/Prenatal Monday - Wednesday, Friday Monday - Friday 8a - 12p  Thursday 8a - 4:45p   Heartbeat  [facility]  [phone]  [address] [place] [address] [address]   [phone] Pregnancy  Pre & Post Adoption Counseling  Pregnancy Support  Prenatal / nutrition Care  Reward Incentive Program [place], [place], Fri 10:00a - 4:30p  Thur 10:00a - [address] [place] Location  Monday - Friday [facility]   OB/GYN  [address] [facility],   [address]  [phone]  Adult Internal Medicine  [address] [facility]  [phone]  [address], [address] [facility]  [phone]  Neuro / Headache  [facility] [facility],   [facility]   [phone] Monday - Friday  [address] [facility] [address]  [address]  [phone] [place], [place]  9:00a - 4:30p  Wed 1:00p - 4:30p   [facility] [address]  [phone] Family Practice Monday - Friday  8:30a - 5:00p     [facility]  [address], [facility] [address]  [phone] Burn/Plastic, ENT, GI, Orthopedics, Surgical / Trauma, Urology, Vascular Monday - Friday 8:00 - 4:30p    Call for an appointment   [place] [address]  [phone] Adult Medicine, [facility], Dental  Patient must be certified homeless  Under age 25 not accepted Monday - Friday 8:00 - 101 Dates   [address] [facility]  [phone]  OB   [phone] Monday, Tuesday, Wednesday, Friday 9a - 5p  Thursday 9a - 6p  Wednesday (OB only)     [facility]   [address]  [phone] OB/Prenatal Monday 11a -7p  [place] 9a - 5p  Friday 8a - 4p  1st Saturday 9a -1p   Podiatry Clinic  [address], [facility] [address]  [phone]  Monday - Friday 8:00 - 4:30 p   Pregnancy Center [facility]  [phone] Free nurse visits  [place] programs  Counseling  Pregnancy Class Call or walk in   The [address] [facility]  [phone] [address] [address], [address]  [phone] Family Practice Monday - Friday  8a - 4:30p   [address], [address]  [phone] [address] [place], [address] [phone]    Monday - Friday 8a - 4:30p    Monday - Friday 8a - 4:30p  Thursday 8a - 8p     Outpatient Clinics     Asthma Management [address]  [phone]  Monday - Friday  9a - 5p   Diabetic Education Services  [phone]  Call for an appointment   [address]  [address]  [phone]  Monday - Friday  8:30a - 4p   [facility] of [address] [facility]  [phone] Must have source of income and must bring (2) recent check stubs to appointment By appointment only   [facility] for the [facility]  [address] [facility]  [phone] Patient must be homeless, call for   eligibility guidelines.   Under age 25 NOT accepted Days and hours vary (Doors open at 8:30a - day of week varies)  Call for an appointment   [facility] Call for Help  [phone])  Call for an appointment   [facility]   ([address] [facility])  [phone]   toll free [phone] For financial assistance

## 2022-11-28 NOTE — ED PROVIDER NOTES
[facility]     Emergency Department     Faculty Attestation    I performed a history and physical examination of the patient and discussed management with the resident. I have reviewed and agree with the residents findings including all diagnostic interpretations, and treatment plans as written at the time of my review. Any areas of disagreement are noted on the chart. I was personally present for the key portions of any procedures. I have documented in the chart those procedures where I was not present during the key portions. For Physician Assistant/ Nurse Practitioner cases/documentation I have personally evaluated this patient and have completed at least one if not all key elements of the E/M (history, physical exam, and MDM). Additional findings are as noted. Primary Care Physician: No primary care provider on file. History: This is a 28 y.o. male who presents to the Emergency Department with complaint of shoulder pain. Patient presents emergency department complaint of left shoulder pain this been ongoing for last 2 months. Patient is not taken any analgesia at home. Patient has had pain is worse when he lifts his arm above his head. He denies any numbness, tingling or weakness. He denies any neck pain. Denies any chest pain or shortness of breath. The patient denies any trauma. Physical:   oral temperature is 97.7 °F (36.5 °C). His blood pressure is 107/80 and his pulse is 68. His respiration is 18 and oxygen saturation is 96%. There is some tenderness to palpation along the anterior portion of the left shoulder. Full range of motion is demonstrated. Exacerbation of symptoms with bringing the R arm above the 90 degree abduction. Radial pulses 2/4. There is no exacerbation of pain with axial loading.     Impression: Chronic left shoulder pain    Plan: Analgesia, PCP follow-up      (Please note that portions of this note were completed with a voice recognition program.  Efforts were made to edit the dictations but occasionally words are mis-transcribed.)    Leonora Jacome.  Swathi Barney MD, [facility]  Attending Emergency Medicine Physician        Minor Grace MD  11/28/22 6506

## 2022-11-28 NOTE — ED NOTES
Pt presents to the ED c/o left shoulder pain and decreased mobility x 3 months that is worsening. Pt denies any injury, CP, SOB, nausea or vomiting. Pt denies any neck pain. Pt A&O x 4, does not appear in acute distress, RR even and unlabored, resting comfortably on stretcher with eyes open and call light in reach. Vital signs obtained, medical hx and allergies reviewed with pt. Dr. Keshav Lynn at bedside to assess pt.   Initial assessment performed by physician, [address] [place] [address] will carry out initial orders/tasks and reassess pt.     Yogesh Crandall LPN  17/02/87 8239

## 2023-02-28 VITALS
WEIGHT: 147 LBS | RESPIRATION RATE: 15 BRPM | OXYGEN SATURATION: 100 % | HEIGHT: 64 IN | BODY MASS INDEX: 25.1 KG/M2 | HEART RATE: 90 BPM | DIASTOLIC BLOOD PRESSURE: 73 MMHG | TEMPERATURE: 97.7 F | SYSTOLIC BLOOD PRESSURE: 139 MMHG

## 2023-02-28 PROCEDURE — 99284 EMERGENCY DEPT VISIT MOD MDM: CPT

## 2023-02-28 PROCEDURE — 87491 CHLMYD TRACH DNA AMP PROBE: CPT

## 2023-02-28 PROCEDURE — 87086 URINE CULTURE/COLONY COUNT: CPT

## 2023-02-28 PROCEDURE — 96372 THER/PROPH/DIAG INJ SC/IM: CPT

## 2023-02-28 PROCEDURE — 87591 N.GONORRHOEAE DNA AMP PROB: CPT

## 2023-02-28 PROCEDURE — 81001 URINALYSIS AUTO W/SCOPE: CPT

## 2023-02-28 ASSESSMENT — PAIN - FUNCTIONAL ASSESSMENT: PAIN_FUNCTIONAL_ASSESSMENT: 0-10

## 2023-02-28 ASSESSMENT — PAIN DESCRIPTION - FREQUENCY: FREQUENCY: OTHER (COMMENT)

## 2023-02-28 ASSESSMENT — PAIN DESCRIPTION - LOCATION: LOCATION: PENIS

## 2023-02-28 ASSESSMENT — PAIN SCALES - GENERAL: PAINLEVEL_OUTOF10: 6

## 2023-02-28 ASSESSMENT — PAIN DESCRIPTION - PAIN TYPE: TYPE: ACUTE PAIN

## 2023-02-28 ASSESSMENT — PAIN DESCRIPTION - DESCRIPTORS: DESCRIPTORS: BURNING

## 2023-03-01 ENCOUNTER — HOSPITAL ENCOUNTER (EMERGENCY)
Age: 33
Discharge: HOME | End: 2023-03-01
Attending: EMERGENCY MEDICINE

## 2023-03-01 DIAGNOSIS — N34.2 URETHRITIS: Primary | ICD-10-CM

## 2023-03-01 LAB
BACTERIA: NORMAL
BILIRUBIN URINE: NEGATIVE
CASTS UA: NORMAL /LPF
CHLAMYDIA DNA UR QL NAA+PROBE: NEGATIVE
COLOR: YELLOW
EPITHELIAL CELLS UA: NORMAL /HPF
GLUCOSE UR STRIP.AUTO-MCNC: NEGATIVE MG/DL
KETONES UR STRIP.AUTO-MCNC: ABNORMAL MG/DL
LEUKOCYTE ESTERASE UR QL STRIP.AUTO: ABNORMAL
N GONORRHOEA DNA UR QL NAA+PROBE: NEGATIVE
NITRITE UR QL STRIP.AUTO: NEGATIVE
PROT UR STRIP.AUTO-MCNC: 5.5 MG/DL (ref 5–8)
PROT UR STRIP.AUTO-MCNC: NEGATIVE MG/DL
RBC CLUMPS #/AREA URNS AUTO: NORMAL /HPF
SPECIFIC GRAVITY UA: 1.02 (ref 1–1.03)
SPECIMEN DESCRIPTION: NORMAL
TURBIDITY: CLEAR
URINE HGB: NEGATIVE
UROBILINOGEN, URINE: NORMAL
WBC UA: NORMAL /HPF

## 2023-03-01 PROCEDURE — 6360000002 HC RX W HCPCS: Performed by: EMERGENCY MEDICINE

## 2023-03-01 RX ORDER — CEFTRIAXONE 500 MG/1
500 INJECTION, POWDER, FOR SOLUTION INTRAMUSCULAR; INTRAVENOUS ONCE
Status: COMPLETED | OUTPATIENT
Start: 2023-03-01 | End: 2023-03-01

## 2023-03-01 RX ORDER — DOXYCYCLINE HYCLATE 100 MG
100 TABLET ORAL 2 TIMES DAILY
Qty: 14 TABLET | Refills: 0 | Status: SHIPPED | OUTPATIENT
Start: 2023-03-01 | End: 2023-03-08

## 2023-03-01 RX ADMIN — CEFTRIAXONE SODIUM 500 MG: 500 INJECTION, POWDER, FOR SOLUTION INTRAMUSCULAR; INTRAVENOUS at 01:21

## 2023-03-01 ASSESSMENT — ENCOUNTER SYMPTOMS
RHINORRHEA: 0
COUGH: 0

## 2023-03-01 ASSESSMENT — PAIN - FUNCTIONAL ASSESSMENT: PAIN_FUNCTIONAL_ASSESSMENT: NONE - DENIES PAIN

## 2023-03-01 NOTE — ED PROVIDER NOTES
[address]  EMERGENCY DEPARTMENT ENCOUNTER      Pt Name: Chas Porter  MRN: [id number]  [place] 1990  Date of evaluation: 3/1/23      CHIEF COMPLAINT       Chief Complaint   Patient presents with    Other     Wants checked for STD        HISTORY OF PRESENT ILLNESS   HPI 35 y.o. male presents with c/o std check. Pt reports that he has been having burning with urination and penile drainage for the last day. He reports unprotected intercourse 2 weeks ago, female partner. He states that he does have a h/o gonorrhea, and states that he completed treatment at that time and his symptoms resolved. REVIEW OF SYSTEMS       Review of Systems   Constitutional:  Negative for fever. HENT:  Negative for congestion and rhinorrhea. Respiratory:  Negative for cough. Genitourinary:  Positive for dysuria and penile discharge. PAST MEDICAL HISTORY     Past Medical History:   Diagnosis Date    History of gonorrhea        SURGICAL HISTORY     History reviewed. No pertinent surgical history. CURRENT MEDICATIONS       Discharge Medication List as of 3/1/2023  1:36 AM        CONTINUE these medications which have NOT CHANGED    Details   ibuprofen (ADVIL;MOTRIN) 600 MG tablet Take 1 tablet by mouth 3 times daily as needed for Pain, Disp-21 tablet, R-0Print      fluticasone (FLONASE) 50 MCG/ACT nasal spray 1 spray by Each Nostril route daily for 7 days, Disp-1 Bottle, R-0Print             ALLERGIES     has No Known Allergies. FAMILY HISTORY     has no family status information on file. SOCIAL HISTORY      reports that he has been smoking cigarettes. He has been smoking an average of .25 packs per day. He has never used smokeless tobacco. He reports current alcohol use. He reports that he does not use drugs.     PHYSICAL EXAM     INITIAL VITALS: /73   Pulse 90   Temp 97.7 °F (36.5 °C) (Oral)   Resp 15   Ht 5' 4\" (1.626 m)   Wt 147 lb (66.7 kg)   SpO2 100%   BMI 25.23 kg/m²   Gen: nad  Head: Normocephalic, atraumatic  Eye: Pupils equal round reactive to light, no conjunctivitis  ENT: MMM  Neck: No adenopathy  Heart: Regular rate and rhythm no murmurs  Lungs: Clear to auscultation bilaterally, no respiratory distress  Abdomen: Soft, nontender, nondistended, with no peritoneal signs  : no external lesions. No inguinal adenopathy. No testicular ttp. Neurologic: Patient is alert and oriented x3, ambulatory with a normal gait, fluent speech    MEDICAL DECISION MAKIN yo M with comoridities of unprotected intercourse and h/o gonorrhea presenting with symptoms consistent with urethritis. Ddx gonorrhea, chlamydia, trichomonas. Discussed with him need for outpatient testing for HIV and spyhilis. Starting on abx. D/w pt treatment plan, warning precautions for prompt ED return and importance of close OP FU, he verbalizes understanding and agrees with the treatment plan. DATA FOR LAB AND RADIOLOGY TESTS ORDERED BELOW ARE REVIEWED BY THE ED CLINICIAN:    LABS: Lab orders shown below, the results are reviewed by myself, and all abnormals are listed below.   Labs Reviewed   URINALYSIS WITH REFLEX TO CULTURE - Abnormal; Notable for the following components:       Result Value    Ketones, Urine TRACE (*)     Leukocyte Esterase, Urine SMALL (*)     All other components within normal limits   C.TRACHOMATIS N.GONORRHOEAE DNA, URINE   CULTURE, URINE   MICROSCOPIC URINALYSIS       Vitals Reviewed:    Vitals:    23 2342 23 2344   BP: 139/73    Pulse: 90    Resp: 15    Temp:  97.7 °F (36.5 °C)   TempSrc:  Oral   SpO2: 100%    Weight: 147 lb (66.7 kg)    Height: 5' 4\" (1.626 m)      MEDICATIONS GIVEN TO PATIENT THIS ENCOUNTER:  Orders Placed This Encounter   Medications    cefTRIAXone (ROCEPHIN) injection 500 mg     Order Specific Question:   Antimicrobial Indications     Answer:   STD infection    doxycycline hyclate (VIBRA-TABS) 100 MG tablet     Sig: Take 1 tablet by mouth 2 times daily for 7 days     Dispense:  14 tablet     Refill:  0     DISCHARGE PRESCRIPTIONS:  Discharge Medication List as of 3/1/2023  1:36 AM        START taking these medications    Details   doxycycline hyclate (VIBRA-TABS) 100 MG tablet Take 1 tablet by mouth 2 times daily for 7 days, Disp-14 tablet, R-0Normal           PHYSICIAN CONSULTS ORDERED THIS ENCOUNTER:  None     FINAL IMPRESSION      1.  Urethritis          DISPOSITION/PLAN   DISPOSITION Decision To Discharge 03/01/2023 01:12:18 AM      PATIENT REFERRED TO:  AIDA SALVADOR [facility]  [address] [facility]  [address] [address]  [phone]  In 1 week      [facility] ED  [facility] [address]  [address] [address]  [phone]    If symptoms worsen    DISCHARGE MEDICATIONS:  Discharge Medication List as of 3/1/2023  1:36 AM        START taking these medications    Details   doxycycline hyclate (VIBRA-TABS) 100 MG tablet Take 1 tablet by mouth 2 times daily for 7 days, Disp-14 tablet, R-0Normal               Alison Hawkins MD  Attending Emergency Physician                      Alison Hawkins MD  03/01/23 [id number]

## 2023-03-02 LAB
MICROORGANISM SPEC CULT: NO GROWTH
SPECIMEN DESCRIPTION: NORMAL

## 2023-06-30 ENCOUNTER — HOSPITAL ENCOUNTER (EMERGENCY)
Age: 33
Discharge: HOME | End: 2023-06-30
Attending: EMERGENCY MEDICINE
Payer: COMMERCIAL

## 2023-06-30 VITALS
HEART RATE: 97 BPM | DIASTOLIC BLOOD PRESSURE: 79 MMHG | TEMPERATURE: 98.5 F | HEIGHT: 64 IN | RESPIRATION RATE: 16 BRPM | BODY MASS INDEX: 26.5 KG/M2 | WEIGHT: 155.2 LBS | SYSTOLIC BLOOD PRESSURE: 124 MMHG | OXYGEN SATURATION: 98 %

## 2023-06-30 DIAGNOSIS — L02.91 ABSCESS: Primary | ICD-10-CM

## 2023-06-30 PROCEDURE — 99283 EMERGENCY DEPT VISIT LOW MDM: CPT

## 2023-06-30 PROCEDURE — 10060 I&D ABSCESS SIMPLE/SINGLE: CPT

## 2023-06-30 PROCEDURE — 2500000003 HC RX 250 WO HCPCS: Performed by: STUDENT IN AN ORGANIZED HEALTH CARE EDUCATION/TRAINING PROGRAM

## 2023-06-30 PROCEDURE — 6370000000 HC RX 637 (ALT 250 FOR IP): Performed by: STUDENT IN AN ORGANIZED HEALTH CARE EDUCATION/TRAINING PROGRAM

## 2023-06-30 RX ORDER — CEPHALEXIN 500 MG/1
500 CAPSULE ORAL 4 TIMES DAILY
Qty: 28 CAPSULE | Refills: 0 | Status: SHIPPED | OUTPATIENT
Start: 2023-06-30 | End: 2023-07-07

## 2023-06-30 RX ORDER — CEPHALEXIN 500 MG/1
500 CAPSULE ORAL ONCE
Status: COMPLETED | OUTPATIENT
Start: 2023-06-30 | End: 2023-06-30

## 2023-06-30 RX ORDER — LIDOCAINE HYDROCHLORIDE 10 MG/ML
20 INJECTION, SOLUTION INFILTRATION; PERINEURAL ONCE
Status: COMPLETED | OUTPATIENT
Start: 2023-06-30 | End: 2023-06-30

## 2023-06-30 RX ORDER — IBUPROFEN 800 MG/1
800 TABLET ORAL ONCE
Status: COMPLETED | OUTPATIENT
Start: 2023-06-30 | End: 2023-06-30

## 2023-06-30 RX ORDER — IBUPROFEN 600 MG/1
600 TABLET ORAL EVERY 8 HOURS PRN
Qty: 360 TABLET | Refills: 1 | Status: SHIPPED | OUTPATIENT
Start: 2023-06-30

## 2023-06-30 RX ADMIN — IBUPROFEN 800 MG: 800 TABLET, FILM COATED ORAL at 15:51

## 2023-06-30 RX ADMIN — LIDOCAINE HYDROCHLORIDE 20 ML: 10 INJECTION, SOLUTION INFILTRATION; PERINEURAL at 15:51

## 2023-06-30 RX ADMIN — CEPHALEXIN 500 MG: 500 CAPSULE ORAL at 15:51

## 2023-06-30 ASSESSMENT — PAIN SCALES - GENERAL: PAINLEVEL_OUTOF10: 8

## 2023-07-01 ASSESSMENT — ENCOUNTER SYMPTOMS
NAUSEA: 0
ABDOMINAL PAIN: 0
VOMITING: 0

## 2023-07-18 ENCOUNTER — HOSPITAL ENCOUNTER (EMERGENCY)
Age: 33
Discharge: HOME | End: 2023-07-18
Attending: EMERGENCY MEDICINE
Payer: COMMERCIAL

## 2023-07-18 VITALS
HEART RATE: 85 BPM | OXYGEN SATURATION: 99 % | TEMPERATURE: 98.1 F | SYSTOLIC BLOOD PRESSURE: 126 MMHG | WEIGHT: 150 LBS | RESPIRATION RATE: 18 BRPM | DIASTOLIC BLOOD PRESSURE: 84 MMHG | BODY MASS INDEX: 25.75 KG/M2

## 2023-07-18 DIAGNOSIS — Z71.1 CONCERN ABOUT STD IN MALE WITHOUT DIAGNOSIS: Primary | ICD-10-CM

## 2023-07-18 LAB
BILIRUB UR QL STRIP: NEGATIVE
CLARITY UR: CLEAR
COLOR UR: YELLOW
COMMENT: NORMAL
GLUCOSE UR STRIP-MCNC: NEGATIVE MG/DL
HGB UR QL STRIP.AUTO: NEGATIVE
KETONES UR STRIP-MCNC: NEGATIVE MG/DL
LEUKOCYTE ESTERASE UR QL STRIP: NEGATIVE
NITRITE UR QL STRIP: NEGATIVE
PH UR STRIP: 5.5 [PH] (ref 5–8)
PROT UR STRIP-MCNC: NEGATIVE MG/DL
SP GR UR STRIP: 1.02 (ref 1–1.03)
UROBILINOGEN UR STRIP-ACNC: NORMAL EU/DL (ref 0–1)

## 2023-07-18 PROCEDURE — 99284 EMERGENCY DEPT VISIT MOD MDM: CPT

## 2023-07-18 PROCEDURE — 6360000002 HC RX W HCPCS: Performed by: PHYSICIAN ASSISTANT

## 2023-07-18 PROCEDURE — 87491 CHLMYD TRACH DNA AMP PROBE: CPT

## 2023-07-18 PROCEDURE — 6370000000 HC RX 637 (ALT 250 FOR IP): Performed by: PHYSICIAN ASSISTANT

## 2023-07-18 PROCEDURE — 96372 THER/PROPH/DIAG INJ SC/IM: CPT

## 2023-07-18 PROCEDURE — 81003 URINALYSIS AUTO W/O SCOPE: CPT

## 2023-07-18 PROCEDURE — 87591 N.GONORRHOEAE DNA AMP PROB: CPT

## 2023-07-18 RX ORDER — CEFTRIAXONE 500 MG/1
500 INJECTION, POWDER, FOR SOLUTION INTRAMUSCULAR; INTRAVENOUS ONCE
Status: COMPLETED | OUTPATIENT
Start: 2023-07-18 | End: 2023-07-18

## 2023-07-18 RX ORDER — METRONIDAZOLE 500 MG/1
2000 TABLET ORAL ONCE
Status: COMPLETED | OUTPATIENT
Start: 2023-07-18 | End: 2023-07-18

## 2023-07-18 RX ORDER — DOXYCYCLINE HYCLATE 100 MG
100 TABLET ORAL 2 TIMES DAILY
Qty: 14 TABLET | Refills: 0 | Status: SHIPPED | OUTPATIENT
Start: 2023-07-18 | End: 2023-07-25

## 2023-07-18 RX ADMIN — CEFTRIAXONE SODIUM 500 MG: 500 INJECTION, POWDER, FOR SOLUTION INTRAMUSCULAR; INTRAVENOUS at 19:06

## 2023-07-18 RX ADMIN — METRONIDAZOLE 2000 MG: 500 TABLET ORAL at 19:07

## 2023-07-18 ASSESSMENT — LIFESTYLE VARIABLES
HOW OFTEN DO YOU HAVE A DRINK CONTAINING ALCOHOL: MONTHLY OR LESS
HOW MANY STANDARD DRINKS CONTAINING ALCOHOL DO YOU HAVE ON A TYPICAL DAY: 1 OR 2

## 2023-07-18 NOTE — ED PROVIDER NOTES
[facility] ED  eMERGENCY dEPARTMENT eNCOUnter      Pt Name: Jessy Dancer  MRN: [id number]  [facility] 1990  Date of evaluation: 7/18/2023  Provider: Griffin Salas PA-C    CHIEF COMPLAINT       Chief Complaint   Patient presents with    Exposure to STD           HISTORY OF PRESENT ILLNESS  (Location/Symptom, Timing/Onset, Context/Setting, Quality, Duration, Modifying Factors, Severity.)   Jessy Dancer is a 35 y.o. male who presents to the emergency department requesting STD evaluation. Pt currently reports mild penile discharge and dysuria x several day days. Pt denies abdominal pain, nausea, emesis, testicular pain, rashes, lesions, fevers, chills, back pain. Pt would like treated. No other complaints. Nursing Notes were reviewed. REVIEW OF SYSTEMS    (2-9 systems for level 4, 10 or more for level 5)     Review of Systems   Constitutional: Negative. HENT: Negative. Eyes: Negative. Respiratory: Negative. Cardiovascular: Negative. Gastrointestinal: Negative. Musculoskeletal: Negative. Endocrine: Negative. Genitourinary: dysuria, penile discharge   Skin: Negative. Allergic/Immunologic: Negative. Neurological: Negative. Hematological: Negative. Psychiatric/Behavioral: Negative. Except as noted above the remainder of the review of systems was reviewed and negative. PAST MEDICAL HISTORY     Past Medical History:   Diagnosis Date    History of gonorrhea      None otherwise stated in nurses notes    SURGICAL HISTORY     History reviewed. No pertinent surgical history. None otherwise stated in nurses notes    CURRENT MEDICATIONS       Previous Medications    FLUTICASONE (FLONASE) 50 MCG/ACT NASAL SPRAY    1 spray by Each Nostril route daily for 7 days    IBUPROFEN (ADVIL;MOTRIN) 600 MG TABLET    Take 1 tablet by mouth every 8 hours as needed for Pain       ALLERGIES     Patient has no known allergies. FAMILY HISTORY     History reviewed.  No pertinent family

## 2023-07-19 LAB
CHLAMYDIA DNA UR QL NAA+PROBE: NEGATIVE
N GONORRHOEA DNA UR QL NAA+PROBE: NEGATIVE
SPECIMEN DESCRIPTION: NORMAL

## 2023-07-27 ENCOUNTER — HOSPITAL ENCOUNTER (EMERGENCY)
Age: 33
Discharge: HOME | End: 2023-07-27
Attending: EMERGENCY MEDICINE
Payer: COMMERCIAL

## 2023-07-27 VITALS
TEMPERATURE: 97.3 F | DIASTOLIC BLOOD PRESSURE: 71 MMHG | OXYGEN SATURATION: 99 % | RESPIRATION RATE: 18 BRPM | SYSTOLIC BLOOD PRESSURE: 133 MMHG | HEART RATE: 69 BPM

## 2023-07-27 DIAGNOSIS — Z11.3 ROUTINE SCREENING FOR STI (SEXUALLY TRANSMITTED INFECTION): Primary | ICD-10-CM

## 2023-07-27 PROCEDURE — 99283 EMERGENCY DEPT VISIT LOW MDM: CPT

## 2023-07-27 PROCEDURE — 81003 URINALYSIS AUTO W/O SCOPE: CPT

## 2023-07-27 PROCEDURE — 87491 CHLMYD TRACH DNA AMP PROBE: CPT

## 2023-07-27 PROCEDURE — 87591 N.GONORRHOEAE DNA AMP PROB: CPT

## 2023-07-27 PROCEDURE — 87661 TRICHOMONAS VAGINALIS AMPLIF: CPT

## 2023-07-27 ASSESSMENT — ENCOUNTER SYMPTOMS
ABDOMINAL PAIN: 0
VOMITING: 0
NAUSEA: 0

## 2023-07-27 NOTE — DISCHARGE INSTRUCTIONS
Your evaluated for sexually transmitted infections. You have been recently treated for these infections and you were found negative at that time. We would recommend that you check MyChart for your results and return to the ED if they are positive. For pain use acetaminophen (Tylenol) or ibuprofen (Motrin / Advil), unless prescribed medications that have acetaminophen or ibuprofen (or similar medications) in it. You can take over the counter acetaminophen tablets (1 - 2 tablets of the 500-mg strength every 6 hours) or ibuprofen tablets (2 tablets every 4 hours). Do not have any sexual intercourse until the test results are completed in 2 - 3 days. If your results come back positive for a STD then make sure that any of your sexual partners are treated before having intercourse with them. The primary means of preventing STD transmission is with the use of condoms. PLEASE RETURN TO THE EMERGENCY DEPARTMENT IMMEDIATELY for worsening symptoms, pain with urination, discharge from your penis, or if you develop any concerning symptoms such as: high fever not relieved by acetaminophen (Tylenol) and/or ibuprofen (Motrin / Advil), chills, shortness of breath, chest pain, feeling of your heart fluttering or racing, persistent nausea and/or vomiting, vomiting up blood, blood in your stool, loss of consciousness, numbness, weakness or tingling in the arms or legs or change in color of the extremities, changes in mental status, persistent headache, blurry vision loss of bladder / bowel control, unable to follow up with your physician, or other any other care or concern.

## 2023-07-27 NOTE — ED NOTES
The following labs were labeled with appropriate pt sticker and tubed to lab:     [] Blue     [] Lavender   [] on ice  [] Green/yellow  [] Green/black [] on ice  [] [place]  [] on ice  [] Yellow  [] Red  [] Type/ Screen  [] ABG  [] VBG    [] COVID-19 swab    [] Rapid  [] PCR  [] Flu swab  [] Peds Viral Panel     [x] Urine Sample  [] Fecal Sample  [] Pelvic Cultures  [] Blood Cultures  [] X 2  [] STREP Cultures       Claudell Bunde, RN  07/27/23 3322

## 2023-07-28 LAB
CHLAMYDIA DNA UR QL NAA+PROBE: NEGATIVE
N GONORRHOEA DNA UR QL NAA+PROBE: NEGATIVE
SOURCE: NORMAL
SPECIMEN DESCRIPTION: NORMAL
TRICHOMONAS VAGINALI, MOLECULAR: NEGATIVE

## 2023-10-12 ENCOUNTER — APPOINTMENT (OUTPATIENT)
Dept: GENERAL RADIOLOGY | Age: 33
End: 2023-10-12
Payer: COMMERCIAL

## 2023-10-12 ENCOUNTER — HOSPITAL ENCOUNTER (EMERGENCY)
Age: 33
Discharge: HOME | End: 2023-10-12
Attending: EMERGENCY MEDICINE
Payer: COMMERCIAL

## 2023-10-12 VITALS
RESPIRATION RATE: 16 BRPM | HEART RATE: 81 BPM | OXYGEN SATURATION: 98 % | SYSTOLIC BLOOD PRESSURE: 113 MMHG | WEIGHT: 159.61 LBS | BODY MASS INDEX: 27.25 KG/M2 | HEIGHT: 64 IN | TEMPERATURE: 97.8 F | DIASTOLIC BLOOD PRESSURE: 75 MMHG

## 2023-10-12 DIAGNOSIS — S60.551A FOREIGN BODY IN HAND, RIGHT, INITIAL ENCOUNTER: Primary | ICD-10-CM

## 2023-10-12 PROCEDURE — 99283 EMERGENCY DEPT VISIT LOW MDM: CPT

## 2023-10-12 PROCEDURE — 73130 X-RAY EXAM OF HAND: CPT

## 2023-10-12 RX ORDER — CEPHALEXIN 500 MG/1
500 CAPSULE ORAL 2 TIMES DAILY
Qty: 14 CAPSULE | Refills: 0 | Status: SHIPPED | OUTPATIENT
Start: 2023-10-12 | End: 2023-10-19

## 2023-10-12 RX ORDER — IBUPROFEN 400 MG/1
400 TABLET ORAL EVERY 6 HOURS PRN
Qty: 120 TABLET | Refills: 0 | Status: SHIPPED | OUTPATIENT
Start: 2023-10-12 | End: 2023-10-12

## 2023-10-12 RX ORDER — IBUPROFEN 400 MG/1
400 TABLET ORAL EVERY 6 HOURS PRN
Qty: 20 TABLET | Refills: 0 | Status: SHIPPED | OUTPATIENT
Start: 2023-10-12

## 2023-10-12 RX ORDER — ACETAMINOPHEN 500 MG
500 TABLET ORAL 4 TIMES DAILY PRN
Qty: 15 TABLET | Refills: 0 | Status: SHIPPED | OUTPATIENT
Start: 2023-10-12

## 2023-10-12 ASSESSMENT — PAIN DESCRIPTION - FREQUENCY: FREQUENCY: CONTINUOUS

## 2023-10-12 ASSESSMENT — PAIN DESCRIPTION - DESCRIPTORS: DESCRIPTORS: THROBBING

## 2023-10-12 ASSESSMENT — PAIN - FUNCTIONAL ASSESSMENT: PAIN_FUNCTIONAL_ASSESSMENT: 0-10

## 2023-10-12 ASSESSMENT — PAIN DESCRIPTION - ORIENTATION: ORIENTATION: RIGHT

## 2023-10-12 ASSESSMENT — PAIN SCALES - GENERAL: PAINLEVEL_OUTOF10: 8

## 2023-10-12 ASSESSMENT — PAIN DESCRIPTION - LOCATION: LOCATION: FINGER (COMMENT WHICH ONE)

## 2023-10-12 NOTE — ED NOTES
Pt arrives to ED 38 via triage. Pt co metal shaving in R middle finger. Pt states that he got a metal shaving stuck in his finger about a month ago. Pt states it is now starting to become more painful when he touches it. Pt denies taking anything for pain. Pt denies any fevers or chills. Pt respirations are even and unlabored, pt is alert and oriented X 4, speaking in complete sentences, bed is in the lowest position, call light is within reach, NAD noted. Will continue to follow plan of care.         Jono Barcenas RN  10/12/23 9685

## 2023-10-12 NOTE — ED TRIAGE NOTES
Patient presented to the ED today with complaints of foreign object stuck in his finger for the past month.

## 2023-10-12 NOTE — ED PROVIDER NOTES
[facility] ED  Emergency Department Encounter  Emergency Medicine Resident     Pt Name:Fer Tena  MRN: [id number]  [address] [facility] 1990  Date of evaluation: 10/12/23  PCP:  No primary care provider on file. Note Started: 5:59 PM EDT      CHIEF COMPLAINT       Chief Complaint   Patient presents with    Foreign Body     Finger       HISTORY OF PRESENT ILLNESS  (Location/Symptom, Timing/Onset, Context/Setting, Quality, Duration, Modifying Factors, Severity.)      Devyn Martinez is a 35 y.o. male who presents with Possible foreign body in palmar aspect of right middle finger. Patient states that this injury happened approximately 1 month ago when he working with metal.  Patient reports he tried taking a piece out himself with no success. Patient states that he has had increased pain for the last 4 days with pain with flexion of the middle ring finger. Patient denies any fever, chills, nausea, vomiting, numbness, tingling, weakness, abdominal pain, urinary or bowel complaints. PAST MEDICAL / SURGICAL / SOCIAL / FAMILY HISTORY      has a past medical history of History of gonorrhea.       has no past surgical history on file.       Social History     Socioeconomic History    Marital status: Single     Spouse name: Not on file    Number of children: Not on file    Years of education: Not on file    Highest education level: Not on file   Occupational History    Not on file   Tobacco Use    Smoking status: Every Day     Packs/day: .25     Types: Cigarettes    Smokeless tobacco: Never   Substance and Sexual Activity    Alcohol use: Yes    Drug use: No    Sexual activity: Not Currently     Partners: Female   Other Topics Concern    Not on file   Social History Narrative    Not on file     Social Determinants of Health     Financial Resource Strain: Not on file   Food Insecurity: Not on file   Transportation Needs: Not on file   Physical Activity: Not on file   Stress: Not on file   Social

## 2023-10-12 NOTE — DISCHARGE INSTRUCTIONS
You were seen and evaluated at [address] [facility] for a foreign body that has been in your right middle finger for approximately 1 month. X-ray shows that this is too deep to be removed here in the emergency department. Attached is the office for specialty clinic where  Call to schedule appointment. You are provided with antibiotics as well as pain medication prescriptions. Please take as prescribed. Please return to the ED with new or worsening symptoms including nausea, fever, redness of the finger, numbness or tingling of the finger.

## 2023-10-24 ENCOUNTER — OFFICE VISIT (OUTPATIENT)
Dept: BURN CARE | Age: 33
End: 2023-10-24
Payer: COMMERCIAL

## 2023-10-24 VITALS
HEART RATE: 98 BPM | WEIGHT: 162 LBS | HEIGHT: 64 IN | SYSTOLIC BLOOD PRESSURE: 126 MMHG | BODY MASS INDEX: 27.66 KG/M2 | DIASTOLIC BLOOD PRESSURE: 76 MMHG

## 2023-10-24 DIAGNOSIS — S60.459A FOREIGN BODY OF FINGER OF RIGHT HAND, INITIAL ENCOUNTER: Primary | ICD-10-CM

## 2023-10-24 PROCEDURE — 99203 OFFICE O/P NEW LOW 30 MIN: CPT | Performed by: PLASTIC SURGERY

## 2023-10-24 PROCEDURE — 99203 OFFICE O/P NEW LOW 30 MIN: CPT

## 2023-10-24 PROCEDURE — G8427 DOCREV CUR MEDS BY ELIG CLIN: HCPCS | Performed by: PLASTIC SURGERY

## 2023-10-24 PROCEDURE — G8484 FLU IMMUNIZE NO ADMIN: HCPCS | Performed by: PLASTIC SURGERY

## 2023-10-24 PROCEDURE — G8419 CALC BMI OUT NRM PARAM NOF/U: HCPCS | Performed by: PLASTIC SURGERY

## 2023-10-24 PROCEDURE — 4004F PT TOBACCO SCREEN RCVD TLK: CPT | Performed by: PLASTIC SURGERY

## 2023-10-24 RX ORDER — BACITRACIN ZINC 500 [USP'U]/G
OINTMENT TOPICAL ONCE
Status: COMPLETED | OUTPATIENT
Start: 2023-10-24 | End: 2023-10-24

## 2023-10-24 RX ADMIN — BACITRACIN ZINC: 500 OINTMENT TOPICAL at 10:06

## 2023-10-24 NOTE — PROGRESS NOTES
right hand demonstrating a small foreign body located on the volar proximal aspect of the middle finger's middle phalanx. There is no evidence of acute fractures, dislocations or osseous changes. ASSESSMENT:  - Foreign body in right middle finger. PLAN:    -Will schedule for OR foreign body removal.   -De-roofed blister to expose area. Could not identify foreign body. Bacitracin and band-aid was applied to area. -Patient will remain NPO the midnight prior to scheduled OR time.   -Wash gently w/ soap and water  -Patient told to look out for signs of infection  -Keep area clean and dry  -Tylenol/Ibuprofen as needed for pain control  - Please call with any questions or concerns       Geetha Thao DO  Orthopedic Surgery Resident PGY-1  [address] [place] [address]         Attending Physician Statement  I have seen and examined the patient personally and the key elements of all parts of the encounter have been performed by me. I have discussed the case, including pertinent history and exam findings with the resident. I agree with the assessment, plan and orders as documented by the resident.   Nikole Meade MD on10/24/2023 on 10:36 AM

## 2023-10-25 ENCOUNTER — TELEPHONE (OUTPATIENT)
Dept: UROLOGY | Age: 33
End: 2023-10-25

## 2023-10-25 NOTE — DISCHARGE INSTRUCTIONS
Activity  You have had anesthesia today  Do not drive, operate heavy equipment, consume alcoholic beverages, or make any important decisions  for 24 hours   If you are taking pain medication: Do not drive or consume alcohol. Take your time changing positions today. You may feel light headed or dizzy if you move too quickly. Continue your home medications as ordered by your physician. Diet   You can eat your normal diet when you feel well. You should start off with bland foods like chicken soup, toast, or yogurt. Then advance as tolerated. Drink plenty of fluids (unless your doctor tells you not to). Your urine should be very lightly colored without a strong odor. You may have some pain at the surgery site after the procedure. Keep areas clean and dry. May remove dressing in the am and shower    No strenuous activity for 24  HOURS     No swimming, no tub baths,no hot tubs until [facility] with Dr. Martell Puri lightly at first, advance diet as tolerated. Drink plenty of fluids. Keep it covered with a sterile bandage until tomorrow    Steri-strips  will fall off on their own in about a week. You may shower 24 hours after the procedure. Pat the wound dry after you have washed it with a mild soap. Do not submerge the wound in water until it is well-healed. Take pain medication as directed, if necessary per instructions. Complete ALL antibiotics as directed for entire duration of therapy. Stitches will be left in the skin until your follow up appointment.      Call Your Doctor if any of the following occurs:     Signs of infection, including fever and chills   Redness, swelling, increasing pain, excessive bleeding, or discharge from the incision site   Pain that you cannot control with the medicines you have been given   Any new symptoms

## 2023-10-25 NOTE — TELEPHONE ENCOUNTER
Patient is scheduled for surgery on 11/2 at 2:00 PM.  Talked to patient and gave him the date, time to arrive, location and instructions to be NPO after midnight and to make sure he has someone to bring him and take him home. Patient confirmed and verbalized understanding.

## 2023-10-26 NOTE — PROGRESS NOTES
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 Patient is scheduled to see [facility] on Wednesday, 1/11/23 for further evaluation and management of melanoma. Preoperative Instructions:    Stop eating solid foods at midnight the night prior to your surgery. Stop drinking clear liquids at midnight the night prior to your surgery. Arrive at the surgery center (3rd entrance) on ____57-2-22___________ by ___1230____________. Please stop any blood thinning medications as directed by your surgeon or prescribing physician. Failure to stop certain medications may interfere with your scheduled surgery. These may include: Aspirin, Coumadin, Plavix, NSAIDS (Motrin, Aleve, Advil, Mobic, Celebrex), Eliquis, Pradaxa, Xarelto, Fish oil, and herbal supplements. You may continue the rest of your medications through the night before surgery unless instructed otherwise. Day of surgery please take only the following medication(s) with a small sip of water: None    DO NOT SMOKE the morning of surgery. No Alcohol the 24 hours prior to surgery. Please  shower - wash hands with antibacterial soap and water  the day of surgery. DO NOT REMOVE SPLINT OR DRESSING if instructed not to do so. Reminders:  -If you are going home the day of your procedure, you will need a family member or friend to stay during the procedure and drive you home after your procedure. Your  must be 25years of age or older and able to sign off on your discharge instructions.    -If you are going home the same day of your surgery, someone must remain with you for the first 24 hours after your surgery if you receive sedation or anesthesia.      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 Patient is scheduled to see [facility] on Wednesday, 1/11/23 for further evaluation and management of melanoma.

## 2023-11-01 ENCOUNTER — ANESTHESIA EVENT (OUTPATIENT)
Dept: OPERATING ROOM | Age: 33
End: 2023-11-01
Payer: COMMERCIAL

## 2023-11-02 ENCOUNTER — ANESTHESIA (OUTPATIENT)
Dept: OPERATING ROOM | Age: 33
End: 2023-11-02
Payer: COMMERCIAL

## 2023-11-02 ENCOUNTER — APPOINTMENT (OUTPATIENT)
Dept: GENERAL RADIOLOGY | Age: 33
End: 2023-11-02
Attending: PLASTIC SURGERY
Payer: COMMERCIAL

## 2023-11-02 ENCOUNTER — HOSPITAL ENCOUNTER (OUTPATIENT)
Age: 33
Setting detail: OUTPATIENT SURGERY
Discharge: HOME | End: 2023-11-02
Attending: PLASTIC SURGERY | Admitting: PLASTIC SURGERY
Payer: COMMERCIAL

## 2023-11-02 VITALS
OXYGEN SATURATION: 100 % | TEMPERATURE: 96.7 F | DIASTOLIC BLOOD PRESSURE: 90 MMHG | BODY MASS INDEX: 26.39 KG/M2 | SYSTOLIC BLOOD PRESSURE: 123 MMHG | RESPIRATION RATE: 12 BRPM | HEIGHT: 65 IN | WEIGHT: 158.4 LBS | HEART RATE: 54 BPM

## 2023-11-02 DIAGNOSIS — S60.459A SUPERFICIAL FOREIGN BODY OF FINGER WITHOUT MAJOR OPEN WOUND AND WITHOUT INFECTION, INITIAL ENCOUNTER: ICD-10-CM

## 2023-11-02 DIAGNOSIS — G89.18 PAIN FOLLOWING SURGERY OR PROCEDURE: Primary | ICD-10-CM

## 2023-11-02 PROCEDURE — 7100000000 HC PACU RECOVERY - FIRST 15 MIN: Performed by: PLASTIC SURGERY

## 2023-11-02 PROCEDURE — 3700000001 HC ADD 15 MINUTES (ANESTHESIA): Performed by: PLASTIC SURGERY

## 2023-11-02 PROCEDURE — 6360000002 HC RX W HCPCS: Performed by: NURSE ANESTHETIST, CERTIFIED REGISTERED

## 2023-11-02 PROCEDURE — 3700000000 HC ANESTHESIA ATTENDED CARE: Performed by: PLASTIC SURGERY

## 2023-11-02 PROCEDURE — 7100000001 HC PACU RECOVERY - ADDTL 15 MIN: Performed by: PLASTIC SURGERY

## 2023-11-02 PROCEDURE — 2500000003 HC RX 250 WO HCPCS: Performed by: NURSE ANESTHETIST, CERTIFIED REGISTERED

## 2023-11-02 PROCEDURE — 2709999900 HC NON-CHARGEABLE SUPPLY: Performed by: PLASTIC SURGERY

## 2023-11-02 PROCEDURE — 7100000010 HC PHASE II RECOVERY - FIRST 15 MIN: Performed by: PLASTIC SURGERY

## 2023-11-02 PROCEDURE — 2580000003 HC RX 258: Performed by: ANESTHESIOLOGY

## 2023-11-02 PROCEDURE — 3600000012 HC SURGERY LEVEL 2 ADDTL 15MIN: Performed by: PLASTIC SURGERY

## 2023-11-02 PROCEDURE — 88305 TISSUE EXAM BY PATHOLOGIST: CPT

## 2023-11-02 PROCEDURE — 6360000002 HC RX W HCPCS: Performed by: PLASTIC SURGERY

## 2023-11-02 PROCEDURE — 7100000011 HC PHASE II RECOVERY - ADDTL 15 MIN: Performed by: PLASTIC SURGERY

## 2023-11-02 PROCEDURE — 3600000002 HC SURGERY LEVEL 2 BASE: Performed by: PLASTIC SURGERY

## 2023-11-02 RX ORDER — METOCLOPRAMIDE HYDROCHLORIDE 5 MG/ML
10 INJECTION INTRAMUSCULAR; INTRAVENOUS
Status: DISCONTINUED | OUTPATIENT
Start: 2023-11-02 | End: 2023-11-02 | Stop reason: HOSPADM

## 2023-11-02 RX ORDER — MORPHINE SULFATE 2 MG/ML
1 INJECTION, SOLUTION INTRAMUSCULAR; INTRAVENOUS EVERY 5 MIN PRN
Status: DISCONTINUED | OUTPATIENT
Start: 2023-11-02 | End: 2023-11-02 | Stop reason: HOSPADM

## 2023-11-02 RX ORDER — DEXAMETHASONE SODIUM PHOSPHATE 10 MG/ML
INJECTION, SOLUTION INTRAMUSCULAR; INTRAVENOUS PRN
Status: DISCONTINUED | OUTPATIENT
Start: 2023-11-02 | End: 2023-11-02 | Stop reason: SDUPTHER

## 2023-11-02 RX ORDER — SODIUM CHLORIDE 0.9 % (FLUSH) 0.9 %
5-40 SYRINGE (ML) INJECTION PRN
Status: DISCONTINUED | OUTPATIENT
Start: 2023-11-02 | End: 2023-11-02 | Stop reason: HOSPADM

## 2023-11-02 RX ORDER — LIDOCAINE HYDROCHLORIDE 10 MG/ML
1 INJECTION, SOLUTION EPIDURAL; INFILTRATION; INTRACAUDAL; PERINEURAL
Status: DISCONTINUED | OUTPATIENT
Start: 2023-11-02 | End: 2023-11-02 | Stop reason: HOSPADM

## 2023-11-02 RX ORDER — PROPOFOL 10 MG/ML
INJECTION, EMULSION INTRAVENOUS PRN
Status: DISCONTINUED | OUTPATIENT
Start: 2023-11-02 | End: 2023-11-02 | Stop reason: SDUPTHER

## 2023-11-02 RX ORDER — BUPIVACAINE HYDROCHLORIDE 2.5 MG/ML
INJECTION, SOLUTION INFILTRATION; PERINEURAL PRN
Status: DISCONTINUED | OUTPATIENT
Start: 2023-11-02 | End: 2023-11-02 | Stop reason: ALTCHOICE

## 2023-11-02 RX ORDER — SODIUM CHLORIDE, SODIUM LACTATE, POTASSIUM CHLORIDE, CALCIUM CHLORIDE 600; 310; 30; 20 MG/100ML; MG/100ML; MG/100ML; MG/100ML
INJECTION, SOLUTION INTRAVENOUS CONTINUOUS
Status: DISCONTINUED | OUTPATIENT
Start: 2023-11-02 | End: 2023-11-02 | Stop reason: HOSPADM

## 2023-11-02 RX ORDER — SODIUM CHLORIDE 9 MG/ML
INJECTION, SOLUTION INTRAVENOUS PRN
Status: DISCONTINUED | OUTPATIENT
Start: 2023-11-02 | End: 2023-11-02 | Stop reason: HOSPADM

## 2023-11-02 RX ORDER — HYDRALAZINE HYDROCHLORIDE 20 MG/ML
10 INJECTION INTRAMUSCULAR; INTRAVENOUS
Status: DISCONTINUED | OUTPATIENT
Start: 2023-11-02 | End: 2023-11-02 | Stop reason: HOSPADM

## 2023-11-02 RX ORDER — SODIUM CHLORIDE 0.9 % (FLUSH) 0.9 %
5-40 SYRINGE (ML) INJECTION EVERY 12 HOURS SCHEDULED
Status: DISCONTINUED | OUTPATIENT
Start: 2023-11-02 | End: 2023-11-02 | Stop reason: HOSPADM

## 2023-11-02 RX ORDER — LIDOCAINE HYDROCHLORIDE 10 MG/ML
INJECTION, SOLUTION INFILTRATION; PERINEURAL PRN
Status: DISCONTINUED | OUTPATIENT
Start: 2023-11-02 | End: 2023-11-02 | Stop reason: SDUPTHER

## 2023-11-02 RX ORDER — LABETALOL HYDROCHLORIDE 5 MG/ML
10 INJECTION, SOLUTION INTRAVENOUS
Status: DISCONTINUED | OUTPATIENT
Start: 2023-11-02 | End: 2023-11-02 | Stop reason: HOSPADM

## 2023-11-02 RX ORDER — MIDAZOLAM HYDROCHLORIDE 1 MG/ML
INJECTION INTRAMUSCULAR; INTRAVENOUS PRN
Status: DISCONTINUED | OUTPATIENT
Start: 2023-11-02 | End: 2023-11-02 | Stop reason: SDUPTHER

## 2023-11-02 RX ORDER — OXYCODONE HYDROCHLORIDE 5 MG/1
10 TABLET ORAL PRN
Status: DISCONTINUED | OUTPATIENT
Start: 2023-11-02 | End: 2023-11-02 | Stop reason: HOSPADM

## 2023-11-02 RX ORDER — DIPHENHYDRAMINE HYDROCHLORIDE 50 MG/ML
12.5 INJECTION INTRAMUSCULAR; INTRAVENOUS
Status: DISCONTINUED | OUTPATIENT
Start: 2023-11-02 | End: 2023-11-02 | Stop reason: HOSPADM

## 2023-11-02 RX ORDER — MIDAZOLAM HYDROCHLORIDE 2 MG/2ML
2 INJECTION, SOLUTION INTRAMUSCULAR; INTRAVENOUS
Status: DISCONTINUED | OUTPATIENT
Start: 2023-11-02 | End: 2023-11-02 | Stop reason: HOSPADM

## 2023-11-02 RX ORDER — CEPHALEXIN 500 MG/1
500 CAPSULE ORAL 3 TIMES DAILY
Qty: 21 CAPSULE | Refills: 0 | Status: SHIPPED | OUTPATIENT
Start: 2023-11-02 | End: 2023-11-09

## 2023-11-02 RX ORDER — OXYCODONE HYDROCHLORIDE 5 MG/1
5 TABLET ORAL PRN
Status: DISCONTINUED | OUTPATIENT
Start: 2023-11-02 | End: 2023-11-02 | Stop reason: HOSPADM

## 2023-11-02 RX ORDER — ONDANSETRON 2 MG/ML
4 INJECTION INTRAMUSCULAR; INTRAVENOUS
Status: DISCONTINUED | OUTPATIENT
Start: 2023-11-02 | End: 2023-11-02 | Stop reason: HOSPADM

## 2023-11-02 RX ORDER — FENTANYL CITRATE 50 UG/ML
INJECTION, SOLUTION INTRAMUSCULAR; INTRAVENOUS PRN
Status: DISCONTINUED | OUTPATIENT
Start: 2023-11-02 | End: 2023-11-02 | Stop reason: SDUPTHER

## 2023-11-02 RX ORDER — CEFAZOLIN SODIUM 2 G/50ML
SOLUTION INTRAVENOUS PRN
Status: DISCONTINUED | OUTPATIENT
Start: 2023-11-02 | End: 2023-11-02 | Stop reason: SDUPTHER

## 2023-11-02 RX ORDER — HYDROCODONE BITARTRATE AND ACETAMINOPHEN 5; 325 MG/1; MG/1
1 TABLET ORAL EVERY 6 HOURS PRN
Qty: 20 TABLET | Refills: 0 | Status: SHIPPED | OUTPATIENT
Start: 2023-11-02 | End: 2023-11-07

## 2023-11-02 RX ORDER — ONDANSETRON 2 MG/ML
INJECTION INTRAMUSCULAR; INTRAVENOUS PRN
Status: DISCONTINUED | OUTPATIENT
Start: 2023-11-02 | End: 2023-11-02 | Stop reason: SDUPTHER

## 2023-11-02 RX ORDER — CEFAZOLIN 2 G/1
INJECTION, POWDER, FOR SOLUTION INTRAMUSCULAR; INTRAVENOUS
Status: DISCONTINUED
Start: 2023-11-02 | End: 2023-11-02 | Stop reason: HOSPADM

## 2023-11-02 RX ORDER — MEPERIDINE HYDROCHLORIDE 50 MG/ML
12.5 INJECTION INTRAMUSCULAR; INTRAVENOUS; SUBCUTANEOUS ONCE
Status: DISCONTINUED | OUTPATIENT
Start: 2023-11-02 | End: 2023-11-02 | Stop reason: HOSPADM

## 2023-11-02 RX ADMIN — SODIUM CHLORIDE, POTASSIUM CHLORIDE, SODIUM LACTATE AND CALCIUM CHLORIDE: 600; 310; 30; 20 INJECTION, SOLUTION INTRAVENOUS at 13:12

## 2023-11-02 RX ADMIN — PROPOFOL 100 MG: 10 INJECTION, EMULSION INTRAVENOUS at 13:39

## 2023-11-02 RX ADMIN — MIDAZOLAM 2 MG: 1 INJECTION INTRAMUSCULAR; INTRAVENOUS at 13:35

## 2023-11-02 RX ADMIN — PROPOFOL 200 MG: 10 INJECTION, EMULSION INTRAVENOUS at 13:37

## 2023-11-02 RX ADMIN — DEXAMETHASONE SODIUM PHOSPHATE 10 MG: 10 INJECTION, SOLUTION INTRAMUSCULAR; INTRAVENOUS at 13:49

## 2023-11-02 RX ADMIN — FENTANYL CITRATE 25 MCG: 50 INJECTION, SOLUTION INTRAMUSCULAR; INTRAVENOUS at 13:55

## 2023-11-02 RX ADMIN — ONDANSETRON 4 MG: 2 INJECTION INTRAMUSCULAR; INTRAVENOUS at 13:56

## 2023-11-02 RX ADMIN — CEFAZOLIN SODIUM 2000 MG: 2 SOLUTION INTRAVENOUS at 13:41

## 2023-11-02 RX ADMIN — FENTANYL CITRATE 50 MCG: 50 INJECTION, SOLUTION INTRAMUSCULAR; INTRAVENOUS at 13:36

## 2023-11-02 RX ADMIN — LIDOCAINE HYDROCHLORIDE 40 MG: 10 INJECTION, SOLUTION INFILTRATION; PERINEURAL at 13:36

## 2023-11-02 RX ADMIN — FENTANYL CITRATE 25 MCG: 50 INJECTION, SOLUTION INTRAMUSCULAR; INTRAVENOUS at 13:45

## 2023-11-02 ASSESSMENT — LIFESTYLE VARIABLES: SMOKING_STATUS: 1

## 2023-11-02 ASSESSMENT — PAIN - FUNCTIONAL ASSESSMENT: PAIN_FUNCTIONAL_ASSESSMENT: 0-10

## 2023-11-02 NOTE — OP NOTE
Operative Note      Patient: Kyrie Catalan  YOB: 1990  MRN: [id number]    Date of Procedure: 11/2/2023    Pre-Op Diagnosis Codes:     * Superficial foreign body of finger without major open wound and without infection, initial encounter [S60.459A]    Post-Op Diagnosis: Same       Procedure(s):  RIGHT MIDDLE FINGER FOREIGN BODY REMOVAL  Removal foreign body superficial right middle finger with surrounding callus  Total excised diameter callus measuring 0.7    Surgeon(s):  Noam Jade MD    Assistant:   * No surgical staff found *    Anesthesia: General    Estimated Blood Loss (mL): Minimal    Complications: None    Specimens:   ID Type Source Tests Collected by Time Destination   A : excision foreign body right middle finger Tissue Tissue SURGICAL PATHOLOGY Noam Jade MD 11/2/2023 1355        Implants:  * No implants in log *      Drains: * No LDAs found *    Findings: Thickened callus with foreign body        Detailed Description of Procedure: The patient was brought to the operating room and placed under general anesthesia. His right middle finger and right hand were prepped and draped sterile fashion. A finger tourniquet was applied. The thickened callus with embedded foreign body was excised with a 15 blade and submitted for specimen. Total diameter measured 0.7 cm. The incision was closed in a simple rapid fashion with 4-0 chromic suture. Patient tolerated seizure well, bulky dressings were applied after local anesthetic was injected and was taken to postop recovery in stable condition.     Electronically signed by Emilia Shaffer MD on 11/2/2023 at 2:22 PM

## 2023-11-02 NOTE — H&P
Burn/Hand Clinic New Patient Visit        CHIEF COMPLAINT:         Chief Complaint   Patient presents with    New Patient    Hand Injury       Right hand middle finger, piece of medal over a month ago         HISTORY OFPRESENT ILLNESS:       The patient is a 35 y.o. male who is being seen for consultation and evaluation of foreign body in right middle finger over the middle phalanx. 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 At the time he was not wearing gloves. Since this time patient tried to remove the piece with toe nail clippers but was not able to retrieve it. He did go to the ER on 10/12 since there was drainage from the finger. In the ER they placed him on cephalexin for a week and took xrays of this right hand demonstrating the small foreign body. Today patient is doing well, he has minimal pain at entry site. He states the drainage has stopped. We discussed taking him to the OR for removal due to infection risk and blister formation. All questions were answered to patient satisfaction. Past Medical History:    Past Medical History        Past Medical History:   Diagnosis Date    History of gonorrhea              Past Surgical History:    Past Surgical History   No past surgical history on file. Current Medications:   Current Facility-Administered Medications          Current Outpatient Medications   Medication Sig Dispense Refill    acetaminophen (TYLENOL) 500 MG tablet Take 1 tablet by mouth 4 times daily as needed for Pain (Patient not taking: Reported on 10/24/2023) 15 tablet 0    ibuprofen (IBU) 400 MG tablet Take 1 tablet by mouth every 6 hours as needed for Pain (Patient not taking: Reported on 10/24/2023) 20 tablet 0    fluticasone (FLONASE) 50 MCG/ACT nasal spray 1 spray by Each Nostril route daily for 7 days 1 Bottle 0      No current facility-administered medications for this visit.             Allergies:    Patient overlying the area. Tenodesis effect and digital cascade intact. Median/Radial/Ulnar/AIN/PIN motor and sensation intact. Radial artery +2 with BCR. Radiology:      3 views of the right hand demonstrating a small foreign body located on the volar proximal aspect of the middle finger's middle phalanx. There is no evidence of acute fractures, dislocations or osseous changes. ASSESSMENT:  - Foreign body in right middle finger. PLAN:     -Will schedule for OR foreign body removal.   -De-roofed blister to expose area. Could not identify foreign body. Bacitracin and band-aid was applied to area. -Patient will remain NPO the midnight prior to scheduled OR time.   -Wash gently w/ soap and water  -Patient told to look out for signs of infection  -Keep area clean and dry  -Tylenol/Ibuprofen as needed for pain control  - Please call with any questions or concerns     Tigist Awad DO  Orthopedic Surgery Resident PGY-1  [address] [place] [address]          Attending Physician Statement  I have seen and examined the patient personally and the key elements of all parts of the encounter have been performed by me. I have discussed the case, including pertinent history and exam findings with the resident. I agree with the assessment, plan and orders as documented by the resident.   Jose Ramon Nicole MD on10/24/2023 on 10:36 AM

## 2023-11-02 NOTE — ANESTHESIA POSTPROCEDURE EVALUATION
Department of Anesthesiology  Postprocedure Note    Patient: Fiona Hernández  MRN: [id number]  YOB: 1990  Date of evaluation: 11/2/2023      Procedure Summary     Date: 11/02/23 Room / Location: [facility] OR 03 / [employer] [facility]    Anesthesia Start: 9773 Anesthesia Stop: 7557    Procedure: RIGHT MIDDLE FINGER FOREIGN BODY REMOVAL (Right) Diagnosis:       Superficial foreign body of finger without major open wound and without infection, initial encounter      (Superficial foreign body of finger without major open wound and without infection, initial encounter Eileen Elam)    Surgeons: Citlali Wray MD Responsible Provider: Marah Ortega MD    Anesthesia Type: general ASA Status: 2          Anesthesia Type: No value filed.     Isaac Phase I: Isaac Score: 9    Isaac Phase II: Isaac Score: 9      Anesthesia Post Evaluation    Patient location during evaluation: PACU  Patient participation: complete - patient participated  Level of consciousness: awake and alert  Airway patency: patent  Nausea & Vomiting: no nausea and no vomiting  Complications: no  Cardiovascular status: hemodynamically stable  Respiratory status: room air and spontaneous ventilation  Hydration status: euvolemic  Multimodal analgesia pain management approach  Pain management: adequate

## 2023-11-06 LAB — SURGICAL PATHOLOGY REPORT: NORMAL

## 2024-05-29 ENCOUNTER — HOSPITAL ENCOUNTER (EMERGENCY)
Age: 34
Discharge: HOME | End: 2024-05-29
Attending: EMERGENCY MEDICINE
Payer: COMMERCIAL

## 2024-05-29 VITALS
OXYGEN SATURATION: 100 % | WEIGHT: 160 LBS | BODY MASS INDEX: 26.66 KG/M2 | SYSTOLIC BLOOD PRESSURE: 141 MMHG | HEART RATE: 66 BPM | DIASTOLIC BLOOD PRESSURE: 87 MMHG | TEMPERATURE: 97.8 F | RESPIRATION RATE: 18 BRPM

## 2024-05-29 DIAGNOSIS — R10.13 ABDOMINAL PAIN, EPIGASTRIC: Primary | ICD-10-CM

## 2024-05-29 LAB
ALBUMIN SERPL-MCNC: 4.2 G/DL (ref 3.5–5.2)
ALP SERPL-CCNC: 81 U/L (ref 40–129)
ALT SERPL-CCNC: 39 U/L (ref 5–41)
ANION GAP SERPL CALCULATED.3IONS-SCNC: 11 MMOL/L (ref 9–17)
AST SERPL-CCNC: 37 U/L
BASOPHILS # BLD: 0.1 K/UL (ref 0–0.2)
BASOPHILS NFR BLD: 1 % (ref 0–2)
BILIRUB SERPL-MCNC: 0.4 MG/DL (ref 0.3–1.2)
BUN SERPL-MCNC: 12 MG/DL (ref 6–20)
CALCIUM SERPL-MCNC: 9.2 MG/DL (ref 8.6–10.4)
CHLORIDE SERPL-SCNC: 99 MMOL/L (ref 98–107)
CO2 SERPL-SCNC: 22 MMOL/L (ref 20–31)
CREAT SERPL-MCNC: 1.1 MG/DL (ref 0.7–1.2)
EOSINOPHIL # BLD: 0.2 K/UL (ref 0–0.4)
EOSINOPHILS RELATIVE PERCENT: 2 % (ref 0–4)
ERYTHROCYTE [DISTWIDTH] IN BLOOD BY AUTOMATED COUNT: 13.5 % (ref 11.5–14.9)
GFR, ESTIMATED: >90 ML/MIN/1.73M2
GLUCOSE SERPL-MCNC: 103 MG/DL (ref 70–99)
HGB BLD-MCNC: 14.4 G/DL (ref 13.5–17.5)
LIPASE SERPL-CCNC: 38 U/L (ref 13–60)
LYMPHOCYTES NFR BLD: 2.6 K/UL (ref 1–4.8)
LYMPHOCYTES RELATIVE PERCENT: 36 % (ref 24–44)
MCH RBC QN AUTO: 28.2 PG (ref 26–34)
MCHC RBC AUTO-ENTMCNC: 32.5 G/DL (ref 31–37)
MCV RBC AUTO: 86.8 FL (ref 80–100)
MONOCYTES NFR BLD: 0.6 K/UL (ref 0.1–1.3)
MONOCYTES NFR BLD: 8 % (ref 1–7)
NEUTROPHILS NFR BLD: 53 % (ref 36–66)
NEUTS SEG NFR BLD: 4 K/UL (ref 1.3–9.1)
PLATELET # BLD AUTO: 326 K/UL (ref 150–450)
PMV BLD AUTO: 7 FL (ref 6–12)
POTASSIUM SERPL-SCNC: 4.6 MMOL/L (ref 3.7–5.3)
PROT SERPL-MCNC: 7.3 G/DL (ref 6.4–8.3)
RBC # BLD AUTO: 5.1 M/UL (ref 4.5–5.9)
SODIUM SERPL-SCNC: 132 MMOL/L (ref 135–144)
WBC OTHER # BLD: 7.4 K/UL (ref 3.5–11)

## 2024-05-29 PROCEDURE — 99284 EMERGENCY DEPT VISIT MOD MDM: CPT

## 2024-05-29 PROCEDURE — 2500000003 HC RX 250 WO HCPCS

## 2024-05-29 PROCEDURE — 80053 COMPREHEN METABOLIC PANEL: CPT

## 2024-05-29 PROCEDURE — 85025 COMPLETE CBC W/AUTO DIFF WBC: CPT

## 2024-05-29 PROCEDURE — 36415 COLL VENOUS BLD VENIPUNCTURE: CPT

## 2024-05-29 PROCEDURE — 83690 ASSAY OF LIPASE: CPT

## 2024-05-29 PROCEDURE — A4216 STERILE WATER/SALINE, 10 ML: HCPCS

## 2024-05-29 PROCEDURE — 6370000000 HC RX 637 (ALT 250 FOR IP)

## 2024-05-29 PROCEDURE — 96374 THER/PROPH/DIAG INJ IV PUSH: CPT

## 2024-05-29 PROCEDURE — 2580000003 HC RX 258

## 2024-05-29 PROCEDURE — 93005 ELECTROCARDIOGRAM TRACING: CPT

## 2024-05-29 RX ORDER — PANTOPRAZOLE SODIUM 20 MG/1
40 TABLET, DELAYED RELEASE ORAL DAILY
Qty: 30 TABLET | Refills: 0 | Status: SHIPPED | OUTPATIENT
Start: 2024-05-29

## 2024-05-29 RX ORDER — MAGNESIUM HYDROXIDE/ALUMINUM HYDROXICE/SIMETHICONE 120; 1200; 1200 MG/30ML; MG/30ML; MG/30ML
30 SUSPENSION ORAL ONCE
Status: COMPLETED | OUTPATIENT
Start: 2024-05-29 | End: 2024-05-29

## 2024-05-29 RX ORDER — LIDOCAINE HYDROCHLORIDE 20 MG/ML
4 SOLUTION OROPHARYNGEAL ONCE
Status: COMPLETED | OUTPATIENT
Start: 2024-05-29 | End: 2024-05-29

## 2024-05-29 RX ADMIN — FAMOTIDINE 20 MG: 10 INJECTION, SOLUTION INTRAVENOUS at 15:40

## 2024-05-29 RX ADMIN — ALUMINUM HYDROXIDE, MAGNESIUM HYDROXIDE, AND SIMETHICONE 30 ML: 1200; 120; 1200 SUSPENSION ORAL at 15:40

## 2024-05-29 RX ADMIN — LIDOCAINE HYDROCHLORIDE 4 ML: 20 SOLUTION ORAL at 15:40

## 2024-05-29 ASSESSMENT — PAIN DESCRIPTION - ORIENTATION: ORIENTATION: UPPER

## 2024-05-29 ASSESSMENT — PAIN SCALES - GENERAL: PAINLEVEL_OUTOF10: 7

## 2024-05-29 ASSESSMENT — PAIN DESCRIPTION - LOCATION: LOCATION: ABDOMEN

## 2024-05-29 ASSESSMENT — LIFESTYLE VARIABLES
HOW MANY STANDARD DRINKS CONTAINING ALCOHOL DO YOU HAVE ON A TYPICAL DAY: PATIENT DOES NOT DRINK
HOW OFTEN DO YOU HAVE A DRINK CONTAINING ALCOHOL: NEVER

## 2024-05-29 ASSESSMENT — PAIN DESCRIPTION - DESCRIPTORS: DESCRIPTORS: SHARP

## 2024-05-29 NOTE — ED PROVIDER NOTES
[facility] ED  eMERGENCY dEPARTMENT eNCOUnter   Attending Attestation     Pt Name: Fer Ojeda  MRN: [id number]  Birthdate 1990  Date of evaluation: 5/29/24       Fer Ojeda is a 34 y.o. male who presents with Abdominal Pain      History:   Patient has had a couple day history of epigastric abdominal pain.  Patient denies any vomiting diarrhea fevers or difficulty urinating.    Exam: Vitals:   Vitals:    05/29/24 1512   BP: (!) 141/87   Pulse: 66   Resp: 18   Temp: 97.8 °F (36.6 °C)   TempSrc: Oral   SpO2: 100%   Weight: 72.6 kg (160 lb)     Heart regular rate rhythm no murmurs.  Lungs clear to auscultation bilaterally.  Abdomen soft nondistended with some tenderness palpation epigastrium with no peritoneal signs.    I performed a history and physical examination of the patient and discussed management with the resident. I reviewed the resident’s note and agree with the documented findings and plan of care. Any areas of disagreement are noted on the chart. I was personally present for the key portions of any procedures. I have documented in the chart those procedures where I was not present during the key portions. I have personally reviewed all images and agree with the resident's interpretation. I have reviewed the emergency nurses triage note. I agree with the chief complaint, past medical history, past surgical history, allergies, medications, social and family history as documented unless otherwise noted below. Documentation of the HPI, Physical Exam and Medical Decision Making performed by medical students or scribes is based on my personal performance of the HPI, PE and MDM. I personally evaluated and examined the patient in conjunction with the APC and agree with the assessment, treatment plan, and disposition of the patient as recorded by the APC. Additional findings are as noted.    Ernesto Sutherland MD  Attending Emergency  Physician             Ernesto Sutherland MD  05/29/24 6492

## 2024-05-29 NOTE — ED PROVIDER NOTES
[facility] ED  Emergency Department Encounter  Emergency Medicine Resident     Pt Name:Fer Ojeda  MRN: [id number]  Birthdate 1990  Date of evaluation: 5/29/24  PCP:  No primary care provider on file.  Note Started: 3:17 PM EDT      CHIEF COMPLAINT       Chief Complaint   Patient presents with    Abdominal Pain       HISTORY OF PRESENT ILLNESS  (Location/Symptom, Timing/Onset, Context/Setting, Quality, Duration, Modifying Factors, Severity.)      Fer Ojeda is a 34 y.o. male who presents with abdominal pain.  Patient states that he had 2 beers a couple of nights ago and ever since has been having a burning pain in his upper abdomen.  He states he has a history of GERD and this feels similar.  Has not take any medications at home.  Denies any chest pain, shortness of breath, nausea, vomiting or any other complaints.  He denies any cardiac history, family history of cardiac disease.    PAST MEDICAL / SURGICAL / SOCIAL / FAMILY HISTORY      has a past medical history of History of gonorrhea.       has a past surgical history that includes Finger surgery (11/02/2023) and Arm Surgery (Right, 11/2/2023).      Social History     Socioeconomic History    Marital status: Single     Spouse name: Not on file    Number of children: Not on file    Years of education: Not on file    Highest education level: Not on file   Occupational History    Not on file   Tobacco Use    Smoking status: Every Day     Current packs/day: 0.25     Types: Cigarettes    Smokeless tobacco: Never   Substance and Sexual Activity    Alcohol use: Yes     Comment: not daily    Drug use: No    Sexual activity: Not Currently     Partners: Female   Other Topics Concern    Not on file   Social History Narrative    Not on file     Social Determinants of Health     Financial Resource Strain: Not on file   Food Insecurity: Not on file   Transportation Needs: Not on file   Physical Activity: Not on file   Stress: Not on file   Social

## 2024-05-29 NOTE — DISCHARGE INSTRUCTIONS
You were seen today in the emergency department for your abdominal pain.  We have evaluated you and determined that you likely have GERD or reflux disease.  We now feel you are safe for discharge home.  Please take 2 tabs up Protonix each morning before breakfast.    Please return to the emergency department immediately if develop any new or worsening concerns including chest pain, shortness of breath, abdominal pain, nausea, vomiting, diarrhea, weakness, loss consciousness, fever, chills, or any other concerns.    Please call your PCP and schedule appointment within the next 24 to 48 hours for follow-up.

## 2024-05-31 LAB
EKG ATRIAL RATE: 78 BPM
EKG P AXIS: 43 DEGREES
EKG P-R INTERVAL: 138 MS
EKG Q-T INTERVAL: 390 MS
EKG QRS DURATION: 84 MS
EKG QTC CALCULATION (BAZETT): 444 MS
EKG R AXIS: 36 DEGREES
EKG T AXIS: 38 DEGREES
EKG VENTRICULAR RATE: 78 BPM

## 2025-06-30 ENCOUNTER — HOSPITAL ENCOUNTER (EMERGENCY)
Age: 35
Discharge: HOME | End: 2025-06-30
Attending: EMERGENCY MEDICINE
Payer: COMMERCIAL

## 2025-06-30 VITALS
WEIGHT: 160 LBS | HEIGHT: 64 IN | HEART RATE: 82 BPM | DIASTOLIC BLOOD PRESSURE: 89 MMHG | OXYGEN SATURATION: 97 % | TEMPERATURE: 98.5 F | BODY MASS INDEX: 27.31 KG/M2 | SYSTOLIC BLOOD PRESSURE: 148 MMHG | RESPIRATION RATE: 16 BRPM

## 2025-06-30 DIAGNOSIS — Z71.1 CONCERN ABOUT STD IN MALE WITHOUT DIAGNOSIS: ICD-10-CM

## 2025-06-30 DIAGNOSIS — R30.0 DYSURIA: Primary | ICD-10-CM

## 2025-06-30 DIAGNOSIS — R36.9 PENILE DISCHARGE: ICD-10-CM

## 2025-06-30 LAB
BILIRUB UR QL STRIP: NEGATIVE
CLARITY UR: CLEAR
COLOR UR: YELLOW
COMMENT: NORMAL
GLUCOSE UR STRIP-MCNC: NEGATIVE MG/DL
HGB UR QL STRIP.AUTO: NEGATIVE
KETONES UR STRIP-MCNC: NEGATIVE MG/DL
LEUKOCYTE ESTERASE UR QL STRIP: NEGATIVE
NITRITE UR QL STRIP: NEGATIVE
PH UR STRIP: 5 [PH] (ref 5–8)
PROT UR STRIP-MCNC: NEGATIVE MG/DL
SP GR UR STRIP: 1.02 (ref 1–1.03)
UROBILINOGEN UR STRIP-ACNC: NORMAL EU/DL (ref 0–1)

## 2025-06-30 PROCEDURE — 96372 THER/PROPH/DIAG INJ SC/IM: CPT

## 2025-06-30 PROCEDURE — 2500000003 HC RX 250 WO HCPCS: Performed by: PHYSICIAN ASSISTANT

## 2025-06-30 PROCEDURE — 99284 EMERGENCY DEPT VISIT MOD MDM: CPT

## 2025-06-30 PROCEDURE — 81003 URINALYSIS AUTO W/O SCOPE: CPT

## 2025-06-30 PROCEDURE — 87591 N.GONORRHOEAE DNA AMP PROB: CPT

## 2025-06-30 PROCEDURE — 6360000002 HC RX W HCPCS: Performed by: PHYSICIAN ASSISTANT

## 2025-06-30 PROCEDURE — 87491 CHLMYD TRACH DNA AMP PROBE: CPT

## 2025-06-30 RX ORDER — DOXYCYCLINE HYCLATE 100 MG
100 TABLET ORAL 2 TIMES DAILY
Qty: 14 TABLET | Refills: 0 | Status: SHIPPED | OUTPATIENT
Start: 2025-06-30 | End: 2025-07-07

## 2025-06-30 RX ORDER — METRONIDAZOLE 500 MG/1
2000 TABLET ORAL ONCE
Qty: 4 TABLET | Refills: 0 | Status: SHIPPED | OUTPATIENT
Start: 2025-06-30 | End: 2025-06-30

## 2025-06-30 RX ADMIN — WATER 500 MG: 1 INJECTION INTRAMUSCULAR; INTRAVENOUS; SUBCUTANEOUS at 19:18

## 2025-06-30 ASSESSMENT — PAIN - FUNCTIONAL ASSESSMENT
PAIN_FUNCTIONAL_ASSESSMENT: NONE - DENIES PAIN

## 2025-06-30 NOTE — ED PROVIDER NOTES
EMERGENCY DEPARTMENT ENCOUNTER    Pt Name: Fer Ojeda  MRN: [id number]  Birthdate 1990  Date of evaluation: 6/30/25  CHIEF COMPLAINT       Chief Complaint   Patient presents with    Dysuria     HISTORY OF PRESENT ILLNESS   Presents to the ED for evaluation of dysuria and clear penile discharge x yesterday.  Pt denies any fever, chills, abdominal pain, vomiting, nausea, back pain, testicular pain or swelling, rashes/ lesions, redness, urgency, frequency, hematuria.  Pt is sexually active. He would like treated for STD's.  No other complaints.        The history is provided by the patient.           PASTMEDICAL HISTORY     Past Medical History:   Diagnosis Date    History of gonorrhea      Past Problem List  There is no problem list on file for this patient.    SURGICAL HISTORY       Past Surgical History:   Procedure Laterality Date    ARM SURGERY Right 11/2/2023    RIGHT MIDDLE FINGER FOREIGN BODY REMOVAL performed by GÉNESIS Jade MD at [facility] OR    FINGER SURGERY  11/02/2023    RIGHT MIDDLE FINGER FOREIGN BODY REMOVAL     CURRENT MEDICATIONS       Discharge Medication List as of 6/30/2025  7:34 PM        CONTINUE these medications which have NOT CHANGED    Details   pantoprazole (PROTONIX) 20 MG tablet Take 2 tablets by mouth daily, Disp-30 tablet, R-0Normal      acetaminophen (TYLENOL) 500 MG tablet Take 1 tablet by mouth 4 times daily as needed for Pain, Disp-15 tablet, R-0Print      ibuprofen (IBU) 400 MG tablet Take 1 tablet by mouth every 6 hours as needed for Pain, Disp-20 tablet, R-0Print      fluticasone (FLONASE) 50 MCG/ACT nasal spray 1 spray by Each Nostril route daily for 7 days, Disp-1 Bottle, R-0Print           ALLERGIES     has no known allergies.  FAMILY HISTORY     has no family status information on file.      SOCIAL HISTORY       Social History     Tobacco Use    Smoking status: Every Day     Current packs/day: 0.25     Types: Cigarettes    Smokeless tobacco: Never

## 2025-06-30 NOTE — ED TRIAGE NOTES
Mode of arrival (squad #, walk in, police, etc) : walk-in        Chief complaint(s): dysuria         Arrival Note (brief scenario, treatment PTA, etc).: Pt reports above symptoms since yesterday        C= \"Have you ever felt that you should Cut down on your drinking?\"  No  A= \"Have people Annoyed you by criticizing your drinking?\"  No  G= \"Have you ever felt bad or Guilty about your drinking?\"  No  E= \"Have you ever had a drink as an Eye-opener first thing in the morning to steady your nerves or to help a hangover?\"  No      Deferred []      Reason for deferring: N/A    *If yes to two or more: probable alcohol abuse.*

## 2025-06-30 NOTE — DISCHARGE INSTRUCTIONS
Please follow-up with your care physician.  Recommend return to the ED if you develop any worsening burning with urination, blood in your urine, penile discharge, testicular pain or swelling, abdominal pain, vomiting, back pain, fevers, rash or any other concerning symptoms.  Your results should be back in 48 hours.  Recommend no intercourse for 1 week.  Partner should be treated as well.

## 2025-06-30 NOTE — ED PROVIDER NOTES
eMERGENCY dEPARTMENT  Attending Physician Attestation     Pt Name: Fer Ojeda  MRN: [id number]  Birthdate 1990  Date of evaluation: 6/30/25    Here for STI check    I was available to render services if needed but did not directly participate in the care of the patient.    Vitals Reviewed:  There were no vitals filed for this visit.    Initial Pain Score:    Last Pain Score:      Alo Milton MD  Attending Emergency Physician                  Alo Milton MD  06/30/25 3772

## 2025-08-05 ENCOUNTER — HOSPITAL ENCOUNTER (EMERGENCY)
Age: 35
Discharge: HOME | End: 2025-08-05
Attending: EMERGENCY MEDICINE
Payer: COMMERCIAL

## 2025-08-05 VITALS
HEART RATE: 94 BPM | OXYGEN SATURATION: 100 % | TEMPERATURE: 98.2 F | SYSTOLIC BLOOD PRESSURE: 133 MMHG | DIASTOLIC BLOOD PRESSURE: 84 MMHG | RESPIRATION RATE: 16 BRPM

## 2025-08-05 DIAGNOSIS — R51.9 ACUTE NONINTRACTABLE HEADACHE, UNSPECIFIED HEADACHE TYPE: Primary | ICD-10-CM

## 2025-08-05 PROCEDURE — 96372 THER/PROPH/DIAG INJ SC/IM: CPT

## 2025-08-05 PROCEDURE — 99284 EMERGENCY DEPT VISIT MOD MDM: CPT

## 2025-08-05 PROCEDURE — 6360000002 HC RX W HCPCS

## 2025-08-05 RX ORDER — DIPHENHYDRAMINE HCL 25 MG
12.5 TABLET ORAL
Status: DISCONTINUED | OUTPATIENT
Start: 2025-08-05 | End: 2025-08-05

## 2025-08-05 RX ORDER — KETOROLAC TROMETHAMINE 30 MG/ML
30 INJECTION, SOLUTION INTRAMUSCULAR; INTRAVENOUS ONCE
Status: COMPLETED | OUTPATIENT
Start: 2025-08-05 | End: 2025-08-05

## 2025-08-05 RX ORDER — PROCHLORPERAZINE MALEATE 10 MG
10 TABLET ORAL ONCE
Status: DISCONTINUED | OUTPATIENT
Start: 2025-08-05 | End: 2025-08-05

## 2025-08-05 RX ORDER — IBUPROFEN 600 MG/1
600 TABLET, FILM COATED ORAL 3 TIMES DAILY PRN
Qty: 30 TABLET | Refills: 0 | Status: SHIPPED | OUTPATIENT
Start: 2025-08-05

## 2025-08-05 RX ORDER — IBUPROFEN 800 MG/1
800 TABLET, FILM COATED ORAL ONCE
Status: DISCONTINUED | OUTPATIENT
Start: 2025-08-05 | End: 2025-08-05

## 2025-08-05 RX ADMIN — KETOROLAC TROMETHAMINE 30 MG: 30 INJECTION, SOLUTION INTRAMUSCULAR at 20:38

## 2025-08-05 ASSESSMENT — PAIN SCALES - GENERAL: PAINLEVEL_OUTOF10: 6

## (undated) DEVICE — MHPB HAND AND FOOT PACK: Brand: MEDLINE INDUSTRIES, INC.

## (undated) DEVICE — SOLUTION IRRIG 1000ML 0.9% SOD CHL USP POUR PLAS BTL

## (undated) DEVICE — LIQUIBAND RAPID ADHESIVE 36/CS 0.8ML: Brand: MEDLINE

## (undated) DEVICE — SOLUTION SCRB 4OZ 4% CHG H2O AIDED FOR PREOPERATIVE SKIN

## (undated) DEVICE — STRIP,CLOSURE,WOUND,MEDI-STRIP,1/2X4: Brand: MEDLINE

## (undated) DEVICE — BANDAGE GZ W2XL75IN ST RAYON POLY CNFRM STRTCH LTWT

## (undated) DEVICE — PREMIUM DRY TRAY LF: Brand: MEDLINE INDUSTRIES, INC.

## (undated) DEVICE — STERILE POLYISOPRENE POWDER-FREE SURGICAL GLOVES: Brand: PROTEXIS

## (undated) DEVICE — STERILE POLYISOPRENE POWDER-FREE SURGICAL GLOVES WITH EMOLLIENT COATING: Brand: PROTEXIS